# Patient Record
Sex: MALE | Race: WHITE | NOT HISPANIC OR LATINO | Employment: OTHER | URBAN - NONMETROPOLITAN AREA
[De-identification: names, ages, dates, MRNs, and addresses within clinical notes are randomized per-mention and may not be internally consistent; named-entity substitution may affect disease eponyms.]

---

## 2018-11-27 ENCOUNTER — OFFICE VISIT (OUTPATIENT)
Dept: ORTHOPEDIC SURGERY | Facility: CLINIC | Age: 63
End: 2018-11-27

## 2018-11-27 DIAGNOSIS — M25.521 ARTHRALGIA OF RIGHT ELBOW: Primary | ICD-10-CM

## 2018-11-27 DIAGNOSIS — M71.021 ABSCESS OF RIGHT OLECRANON BURSA: ICD-10-CM

## 2018-11-27 PROCEDURE — 99204 OFFICE O/P NEW MOD 45 MIN: CPT | Performed by: ORTHOPAEDIC SURGERY

## 2018-11-27 PROCEDURE — 29105 APPLICATION LONG ARM SPLINT: CPT | Performed by: ORTHOPAEDIC SURGERY

## 2018-11-27 RX ORDER — LOSARTAN POTASSIUM 25 MG/1
25 TABLET ORAL DAILY
COMMUNITY
End: 2020-09-10 | Stop reason: SDUPTHER

## 2018-11-27 RX ORDER — AMLODIPINE BESYLATE 10 MG/1
5 TABLET ORAL DAILY
COMMUNITY

## 2018-11-27 RX ORDER — SULFAMETHOXAZOLE AND TRIMETHOPRIM 800; 160 MG/1; MG/1
1 TABLET ORAL 2 TIMES DAILY
Qty: 28 TABLET | Refills: 0 | Status: SHIPPED | OUTPATIENT
Start: 2018-11-27 | End: 2020-09-10

## 2018-11-27 RX ORDER — ASPIRIN 81 MG/1
81 TABLET, CHEWABLE ORAL DAILY
COMMUNITY

## 2018-11-27 NOTE — PROGRESS NOTES
Subjective   Patient ID: Michele Joaquin is a 63 y.o. male  Pain and Consult of the Right Elbow (Is being seen today at the request of Dr. Rafi Tafoya MD.)             Pain   Pertinent negatives include no abdominal pain, chest pain, diaphoresis, fever, headaches, nausea, sore throat or vomiting.     Right-hand-dominant lemos says he fell 4-6 weeks ago on his right elbow sustained an injury had initial pain on the medial epicondylar area, had initial x-rays negative for fracture was found to have swelling and pain given a steroid injection into the olecranon bursa on 11/12--after which he experienced continued pain and swelling was seen 11/20 in the office Dr. Tafoya aspiration performed cultures positive for staph aureus sensitive to Cleocin patient was begun on Cleocin therapy seen again in the office today where continued swelling and fluid collection in the olecranon bursa was noted repeat aspiration was carried out today aspirated for purulent fluid and sent for cultures patient is sent here for further evaluation of his olecranon bursa at the right elbow.  Denies fever denies systemic illness numbness or tingling in the right hand or lower arm, swelling is improved in the forearm and medial collar pain is improved since injury.  He has not been immobilized since injury occurred.      Review of Systems   Constitutional: Negative for diaphoresis, fever and unexpected weight change.   HENT: Negative for dental problem and sore throat.    Eyes: Negative for visual disturbance.   Respiratory: Negative for shortness of breath.    Cardiovascular: Negative for chest pain.   Gastrointestinal: Negative for abdominal pain, constipation, diarrhea, nausea and vomiting.   Genitourinary: Negative for difficulty urinating and frequency.   Neurological: Negative for headaches.   Hematological: Does not bruise/bleed easily.       No past medical history on file.     No past surgical history on file.    No family history on  file.    Social History     Socioeconomic History   • Marital status:      Spouse name: Not on file   • Number of children: Not on file   • Years of education: Not on file   • Highest education level: Not on file   Social Needs   • Financial resource strain: Not on file   • Food insecurity - worry: Not on file   • Food insecurity - inability: Not on file   • Transportation needs - medical: Not on file   • Transportation needs - non-medical: Not on file   Occupational History   • Not on file   Tobacco Use   • Smoking status: Not on file   Substance and Sexual Activity   • Alcohol use: Not on file   • Drug use: Not on file   • Sexual activity: Not on file   Other Topics Concern   • Not on file   Social History Narrative   • Not on file       I have reviewed all of the above social hx, family hx, surgical hx, medications, allergies & ROS and confirm that it is accurate.    Allergies not on file      Current Outpatient Medications:   •  sulfamethoxazole-trimethoprim (BACTRIM DS,SEPTRA DS) 800-160 MG per tablet, Take 1 tablet by mouth 2 (Two) Times a Day., Disp: 28 tablet, Rfl: 0    Objective   There were no vitals taken for this visit.   Physical Exam  Constitutional: Patient is oriented to person, place, and time. Patient appears well-developed and well-nourished.   HENT:Head: Normocephalic and atraumatic.   Eyes: EOM are normal. Pupils are equal, round, and reactive to light.   Neck: Normal range of motion. Neck supple.   Cardiovascular: Normal rate.    Pulmonary/Chest: Effort normal and breath sounds normal.   Abdominal: Soft.   Neurological: Patient is alert and oriented to person, place, and time.   Skin: Skin is warm and dry.   Psychiatric: Patient has a normal mood and affect.   Nursing note and vitals reviewed.       [unfilled]   Right elbow with fluctuance at the olecranon bursa minimal erythema some consolidation of the periphery of the ocular bursa is present with induration no signs of systemic  cellulitis around the upper arm, medial epicondylar region is slightly tender minimally swollen very minimal residual ecchymosis.  Forearm is soft neurovascular status to the hand is intact.  There is no active drainage or open wounds noted at the elbow forearm or hand.  Patient has  minimal pain with supination pronation which is full and intact and symmetric, minimal pain with flexion extension at the elbow.    Assessment/Plan   Review of Radiographic Studies:    Radiographic images today of affected area I personally viewed and showed no sign of acute fracture or dislocation.      Right long arm fiberglass splint application  Date/Time: 11/27/2018 2:01 PM  Performed by: John Mcintyre MD  Authorized by: John Mcintyre MD   Consent: Verbal consent obtained.  Risks and benefits: risks, benefits and alternatives were discussed  Consent given by: patient  Patient understanding: patient states understanding of the procedure being performed  Patient identity confirmed: verbally with patient  Location details: right arm  Splint type: long arm  Supplies used: Ortho-Glass  Post-procedure: The splinted body part was neurovascularly unchanged following the procedure.  Patient tolerance: Patient tolerated the procedure well with no immediate complications           Michele was seen today for pain and consult.    Diagnoses and all orders for this visit:    Arthralgia of right elbow  -     XR Elbow 3+ View Right    Abscess of right olecranon bursa    Other orders  -     sulfamethoxazole-trimethoprim (BACTRIM DS,SEPTRA DS) 800-160 MG per tablet; Take 1 tablet by mouth 2 (Two) Times a Day.       Orthopedic activities reviewed and patient expressed appreciation and Risk, benefits, and merits of treatment alternatives reviewed with the patient and questions answered      Recommendations/Plan:   Work/Activity Status: No use of involved extremity    Patient agreeable to call or return sooner for any concerns.       Reviewed and  discussed the patient's condition with him and his wife given he has had 2 aspirations with minimal fluid collection at the olecranon bursa and already received partial treatments Cleocin recommended observation immobilization change of antibiotic therapy for Cleocin and Bactrim reexamined 2 days.  If fluctuance recurs pain increases or fever develops formal irrigation debridement in the operating room is recommended and I discussed this with the patient he is in agreement.      Impression:  Status post right elbow trauma subsequent olecranon bursitis steroid injection with subsequent staph aureus colonization probable septic bursa  Plan:  Observation posterior splint recheck in 2 days check cultures which were sent from today, continue with Bactrim and place of Cleocin, discuss formal irrigation debridement and intraoperative cultures in  several days if not responding to above treatment.

## 2018-11-29 ENCOUNTER — APPOINTMENT (OUTPATIENT)
Dept: PREADMISSION TESTING | Facility: HOSPITAL | Age: 63
End: 2018-11-29

## 2018-11-29 ENCOUNTER — PREP FOR SURGERY (OUTPATIENT)
Dept: OTHER | Facility: HOSPITAL | Age: 63
End: 2018-11-29

## 2018-11-29 ENCOUNTER — OFFICE VISIT (OUTPATIENT)
Dept: ORTHOPEDIC SURGERY | Facility: CLINIC | Age: 63
End: 2018-11-29

## 2018-11-29 ENCOUNTER — HOSPITAL ENCOUNTER (OUTPATIENT)
Dept: GENERAL RADIOLOGY | Facility: HOSPITAL | Age: 63
Discharge: HOME | End: 2018-11-29
Admitting: ORTHOPAEDIC SURGERY

## 2018-11-29 VITALS — BODY MASS INDEX: 23.48 KG/M2 | HEIGHT: 70 IN | WEIGHT: 164 LBS | RESPIRATION RATE: 16 BRPM

## 2018-11-29 VITALS
HEIGHT: 71 IN | HEART RATE: 64 BPM | OXYGEN SATURATION: 97 % | BODY MASS INDEX: 22.96 KG/M2 | SYSTOLIC BLOOD PRESSURE: 108 MMHG | DIASTOLIC BLOOD PRESSURE: 71 MMHG | WEIGHT: 164 LBS

## 2018-11-29 DIAGNOSIS — M25.521 ARTHRALGIA OF RIGHT ELBOW: Primary | ICD-10-CM

## 2018-11-29 DIAGNOSIS — M25.521 ARTHRALGIA OF RIGHT ELBOW: ICD-10-CM

## 2018-11-29 DIAGNOSIS — Z01.818 PREOP EXAMINATION: ICD-10-CM

## 2018-11-29 DIAGNOSIS — Z01.818 PREOP EXAMINATION: Primary | ICD-10-CM

## 2018-11-29 DIAGNOSIS — M71.121 SEPTIC BURSITIS OF ELBOW, RIGHT: ICD-10-CM

## 2018-11-29 LAB
ALBUMIN SERPL-MCNC: 4.3 G/DL (ref 3.5–5)
ALBUMIN/GLOB SERPL: 1.4 G/DL (ref 1–2)
ALP SERPL-CCNC: 131 U/L (ref 38–126)
ALT SERPL W P-5'-P-CCNC: 82 U/L (ref 13–69)
ANION GAP SERPL CALCULATED.3IONS-SCNC: 11.4 MMOL/L (ref 10–20)
AST SERPL-CCNC: 40 U/L (ref 15–46)
BASOPHILS # BLD AUTO: 0.03 10*3/MM3 (ref 0–0.2)
BASOPHILS NFR BLD AUTO: 0.3 % (ref 0–2.5)
BILIRUB SERPL-MCNC: 0.4 MG/DL (ref 0.2–1.3)
BUN BLD-MCNC: 21 MG/DL (ref 7–20)
BUN/CREAT SERPL: 21 (ref 6.3–21.9)
CALCIUM SPEC-SCNC: 9.6 MG/DL (ref 8.4–10.2)
CHLORIDE SERPL-SCNC: 92 MMOL/L (ref 98–107)
CO2 SERPL-SCNC: 28 MMOL/L (ref 26–30)
CREAT BLD-MCNC: 1 MG/DL (ref 0.6–1.3)
DEPRECATED RDW RBC AUTO: 40.3 FL (ref 37–54)
EOSINOPHIL # BLD AUTO: 0.1 10*3/MM3 (ref 0–0.7)
EOSINOPHIL NFR BLD AUTO: 0.8 % (ref 0–7)
ERYTHROCYTE [DISTWIDTH] IN BLOOD BY AUTOMATED COUNT: 12.7 % (ref 11.5–14.5)
GFR SERPL CREATININE-BSD FRML MDRD: 75 ML/MIN/1.73
GLOBULIN UR ELPH-MCNC: 3 GM/DL
GLUCOSE BLD-MCNC: 85 MG/DL (ref 74–98)
GRAM STN SPEC: NORMAL
GRAM STN SPEC: NORMAL
HCT VFR BLD AUTO: 37.2 % (ref 42–52)
HGB BLD-MCNC: 12.9 G/DL (ref 14–18)
IMM GRANULOCYTES # BLD: 0.17 10*3/MM3 (ref 0–0.06)
IMM GRANULOCYTES NFR BLD: 1.4 % (ref 0–0.6)
LYMPHOCYTES # BLD AUTO: 1.93 10*3/MM3 (ref 0.6–3.4)
LYMPHOCYTES NFR BLD AUTO: 16.1 % (ref 10–50)
MCH RBC QN AUTO: 30 PG (ref 27–31)
MCHC RBC AUTO-ENTMCNC: 34.7 G/DL (ref 30–37)
MCV RBC AUTO: 86.5 FL (ref 80–94)
MONOCYTES # BLD AUTO: 0.85 10*3/MM3 (ref 0–0.9)
MONOCYTES NFR BLD AUTO: 7.1 % (ref 0–12)
NEUTROPHILS # BLD AUTO: 8.88 10*3/MM3 (ref 2–6.9)
NEUTROPHILS NFR BLD AUTO: 74.3 % (ref 37–80)
NRBC BLD MANUAL-RTO: 0 /100 WBC (ref 0–0)
PLATELET # BLD AUTO: 304 10*3/MM3 (ref 130–400)
PMV BLD AUTO: 8.2 FL (ref 6–12)
POTASSIUM BLD-SCNC: 4.4 MMOL/L (ref 3.5–5.1)
PROT SERPL-MCNC: 7.3 G/DL (ref 6.3–8.2)
RBC # BLD AUTO: 4.3 10*6/MM3 (ref 4.7–6.1)
SODIUM BLD-SCNC: 127 MMOL/L (ref 137–145)
WBC NRBC COR # BLD: 11.96 10*3/MM3 (ref 4.8–10.8)

## 2018-11-29 PROCEDURE — 20605 DRAIN/INJ JOINT/BURSA W/O US: CPT | Performed by: ORTHOPAEDIC SURGERY

## 2018-11-29 PROCEDURE — 99214 OFFICE O/P EST MOD 30 MIN: CPT | Performed by: ORTHOPAEDIC SURGERY

## 2018-11-29 PROCEDURE — 87205 SMEAR GRAM STAIN: CPT | Performed by: ORTHOPAEDIC SURGERY

## 2018-11-29 PROCEDURE — 87147 CULTURE TYPE IMMUNOLOGIC: CPT | Performed by: ORTHOPAEDIC SURGERY

## 2018-11-29 PROCEDURE — 71046 X-RAY EXAM CHEST 2 VIEWS: CPT

## 2018-11-29 PROCEDURE — 85025 COMPLETE CBC W/AUTO DIFF WBC: CPT | Performed by: ORTHOPAEDIC SURGERY

## 2018-11-29 PROCEDURE — 93005 ELECTROCARDIOGRAM TRACING: CPT | Performed by: ORTHOPAEDIC SURGERY

## 2018-11-29 PROCEDURE — 87186 SC STD MICRODIL/AGAR DIL: CPT | Performed by: ORTHOPAEDIC SURGERY

## 2018-11-29 PROCEDURE — 80053 COMPREHEN METABOLIC PANEL: CPT | Performed by: ORTHOPAEDIC SURGERY

## 2018-11-29 PROCEDURE — 87075 CULTR BACTERIA EXCEPT BLOOD: CPT | Performed by: ORTHOPAEDIC SURGERY

## 2018-11-29 PROCEDURE — 87081 CULTURE SCREEN ONLY: CPT | Performed by: ORTHOPAEDIC SURGERY

## 2018-11-29 PROCEDURE — 87070 CULTURE OTHR SPECIMN AEROBIC: CPT | Performed by: ORTHOPAEDIC SURGERY

## 2018-11-29 PROCEDURE — 36415 COLL VENOUS BLD VENIPUNCTURE: CPT

## 2018-11-29 PROCEDURE — 87077 CULTURE AEROBIC IDENTIFY: CPT | Performed by: ORTHOPAEDIC SURGERY

## 2018-11-29 RX ORDER — CLINDAMYCIN HYDROCHLORIDE 300 MG/1
300 CAPSULE ORAL 3 TIMES DAILY
COMMUNITY
End: 2020-09-10

## 2018-11-29 RX ORDER — CEFAZOLIN SODIUM 2 G/50ML
2 SOLUTION INTRAVENOUS
Status: CANCELLED | OUTPATIENT
Start: 2018-11-30 | End: 2018-12-01

## 2018-11-29 RX ORDER — ATORVASTATIN CALCIUM 40 MG/1
80 TABLET, FILM COATED ORAL DAILY
COMMUNITY
End: 2020-09-10 | Stop reason: SDUPTHER

## 2018-11-29 RX ORDER — NITROGLYCERIN 0.4 MG/1
0.4 TABLET SUBLINGUAL
COMMUNITY

## 2018-11-29 RX ORDER — ALBUTEROL SULFATE 90 UG/1
2 AEROSOL, METERED RESPIRATORY (INHALATION) EVERY 6 HOURS PRN
COMMUNITY

## 2018-11-29 RX ORDER — MELOXICAM 15 MG/1
15 TABLET ORAL DAILY
COMMUNITY

## 2018-11-29 RX ORDER — ALBUTEROL SULFATE 1.25 MG/3ML
1 SOLUTION RESPIRATORY (INHALATION) EVERY 6 HOURS PRN
COMMUNITY

## 2018-11-29 RX ORDER — HYDROCHLOROTHIAZIDE 25 MG/1
25 TABLET ORAL DAILY
COMMUNITY

## 2018-11-29 NOTE — PROGRESS NOTES
Subjective   Patient ID: Michele Joaquin is a 63 y.o. male  Pain of the Right Elbow             History of Present Illness    Right-hand-dominant aminta who 4-6 weeks ago fell on his elbow sustaining an injury then went to her PCP on 11/12 at that time he was x-rayed found to have no fracture was given an injection of steroid into his olecranon bursa returned again on 11/ 20 where he was found to have small fluid collection which was aspirated sent for culture this confirmed positive staph aureus-- he was placed on Cleocin which he took until 11/27 at which point he will is reaspirated in the PCP office in [facility] found to have small amount of fluid in the olecrenon bursa-- cultures were not sent-- I saw him placed him on antibiotics gave him a splint he comes in today with continued pain swelling and tenderness at the right elbow olecranon region.  Denies fever systemic illness redness in the forearm wrist or hand his medial elbow pain is improved since the date of his original injury 4-6 weeks ago.    Review of Systems   Constitutional: Negative.  Negative for diaphoresis, fever and unexpected weight change.   HENT: Negative for dental problem and sore throat.    Eyes: Negative for visual disturbance.   Respiratory: Negative for shortness of breath.    Cardiovascular: Negative for chest pain.   Gastrointestinal: Negative for abdominal pain, constipation, diarrhea, nausea and vomiting.   Genitourinary: Negative for difficulty urinating and frequency.   Musculoskeletal: Positive for arthralgias (right elbow).   Skin: Positive for wound.   Allergic/Immunologic: Negative.    Neurological: Negative for headaches.   Hematological: Does not bruise/bleed easily.       Past Medical History:   Diagnosis Date   • Emphysema lung (CMS/HCC)    • Hyperlipidemia    • Hypertension         Past Surgical History:   Procedure Laterality Date   • KNEE SURGERY     • PROSTATECTOMY         Family History   Problem Relation Age of  "Onset   • Cancer Father        Social History     Socioeconomic History   • Marital status:      Spouse name: Not on file   • Number of children: Not on file   • Years of education: Not on file   • Highest education level: Not on file   Social Needs   • Financial resource strain: Not on file   • Food insecurity - worry: Not on file   • Food insecurity - inability: Not on file   • Transportation needs - medical: Not on file   • Transportation needs - non-medical: Not on file   Occupational History   • Not on file   Tobacco Use   • Smoking status: Current Every Day Smoker     Packs/day: 1.00     Years: 45.00     Pack years: 45.00   • Smokeless tobacco: Never Used   Substance and Sexual Activity   • Alcohol use: No     Frequency: Never   • Drug use: No   • Sexual activity: Defer   Other Topics Concern   • Not on file   Social History Narrative   • Not on file       I have reviewed all of the above social hx, family hx, surgical hx, medications, allergies & ROS and confirm that it is accurate.    No Known Allergies      Current Outpatient Medications:   •  amLODIPine (NORVASC) 10 MG tablet, Take 10 mg by mouth Daily., Disp: , Rfl:   •  aspirin 81 MG chewable tablet, Chew 81 mg Daily., Disp: , Rfl:   •  losartan (COZAAR) 25 MG tablet, Take 25 mg by mouth Daily., Disp: , Rfl:   •  rivaroxaban (XARELTO) 10 MG tablet, Take 10 mg by mouth Daily., Disp: , Rfl:   •  sulfamethoxazole-trimethoprim (BACTRIM DS,SEPTRA DS) 800-160 MG per tablet, Take 1 tablet by mouth 2 (Two) Times a Day., Disp: 28 tablet, Rfl: 0    Objective   Resp 16   Ht 177.8 cm (70\")   Wt 74.4 kg (164 lb)   BMI 23.53 kg/m²    Physical Exam  Constitutional: Patient is oriented to person, place, and time. Patient appears well-developed and well-nourished.   HENT:Head: Normocephalic and atraumatic.   Eyes: EOM are normal. Pupils are equal, round, and reactive to light.   Neck: Normal range of motion. Neck supple.   Cardiovascular: Normal rate.  "   Pulmonary/Chest: Effort normal and breath sounds normal.   Abdominal: Soft.   Neurological: Patient is alert and oriented to person, place, and time.   Skin: Skin is warm and dry.   Psychiatric: Patient has a normal mood and affect.   Nursing note and vitals reviewed.       [unfilled]   Right elbow with fluctuance and swelling at the olecranon bursa minimal cutaneous erythema some induration around the inferolateral aspect of the olecranon bursal area no radial head tenderness no pain with supination pronation at the elbow joint, no medial epicondylar tenderness forearm soft hands neurovascularly intact no pain at the proximal shoulder with internal/external rotation of the upper arm.    Assessment/Plan   Review of Radiographic Studies:    Radiographic images today of affected area I personally viewed and showed no sign of acute fracture or dislocation.  No new imaging done today.      Medium Joint Arthrocentesis: R olecranon bursa  Date/Time: 11/29/2018 3:56 PM  Consent given by: patient  Site marked: site marked  Timeout: Immediately prior to procedure a time out was called to verify the correct patient, procedure, equipment, support staff and site/side marked as required   Supporting Documentation  Indications: pain, joint swelling and diagnostic evaluation   Procedure Details  Location: elbow - R olecranon bursa  Preparation: Patient was prepped and draped in the usual sterile fashion  Needle size: 18 G  Approach: posterior  Aspirate amount: 20 mL  Aspirate: blood-tinged and yellow (pus)  Analysis: fluid sample sent for laboratory analysis  Patient tolerance: patient tolerated the procedure well with no immediate complications           Michele was seen today for pain.    Diagnoses and all orders for this visit:    Arthralgia of right elbow  -     Body Fluid Cell Count With Differential - Bursal Fluid, Elbow, Right  -     Anaerobic Culture - Swab, Elbow, Right  -     Culture, Routine - Swab, Elbow, Right  -      Gram Stain - Swab, Elbow, Right  -     CBC Auto Differential; Future    Septic bursitis of elbow, right       Risk, benefits, and merits of treatment alternatives reviewed with the patient and questions answered, The nature of the proposed surgery reviewed with the patient including risks, benefits, rehabilitation, recovery timeframe, and outcome expectations and Using illustrations and models, the nature of the pathology was explained to the patient      Recommendations/Plan:   Surgery: Incision drainage irrigation debridement right septic olecranon bursa    Patient agreeable to call or return sooner for any concerns.         I discussed with the patient the diagnosis, condition, natural history and treatment alternatives, both surgical and nonsurgical.  I reviewed the surgical procedural details using models, diagrams and reviewing x-ray findings.  I explained the nature of the operation, anesthesia methods and the postoperative recovery.  I explained risks and complications including but not limited to infection, bleeding, blood clotting, poor healing, chronic pain, stiffness, failure of the procedure, possible recurrence of the condition and anesthetic related risks.  The patient had opportunity to ask questions which were all answered to their satisfaction and decided to proceed with the plan for surgery.  I believe informed consent to proceed with the surgery was given verbally in my presence today.  The surgical consent form will be signed in the presence of the nursing staff prior to the surgery.    I explained the patient and his wife the risks of recurrent infection poor healing especially given the fact that this is an indolent more than 2 week old septic olecranon bursitis along with risks and complications of cellulitis neurovascular injury and failure of healing chronic pain stiffness and anesthesia related risks.  Patient does have a cardiac history and is instructed to discontinue his anticoagulants  on the morning of surgery.  He also understands increased risk of blood loss secondary to his anticoagulation status.    Impression:  2 week old right elbow septic olecranon bursitis without signs of septic arthritis or cellulitis  Plan:  Incision drainage irrigation debridement septic right elbow olecranon bursa

## 2018-11-30 ENCOUNTER — ANESTHESIA (OUTPATIENT)
Dept: PERIOP | Facility: HOSPITAL | Age: 63
End: 2018-11-30

## 2018-11-30 ENCOUNTER — HOSPITAL ENCOUNTER (OUTPATIENT)
Facility: HOSPITAL | Age: 63
Setting detail: HOSPITAL OUTPATIENT SURGERY
Discharge: HOME | End: 2018-11-30
Attending: ORTHOPAEDIC SURGERY | Admitting: ORTHOPAEDIC SURGERY

## 2018-11-30 ENCOUNTER — ANESTHESIA EVENT (OUTPATIENT)
Dept: PERIOP | Facility: HOSPITAL | Age: 63
End: 2018-11-30

## 2018-11-30 VITALS
OXYGEN SATURATION: 93 % | DIASTOLIC BLOOD PRESSURE: 98 MMHG | TEMPERATURE: 97.2 F | SYSTOLIC BLOOD PRESSURE: 127 MMHG | RESPIRATION RATE: 18 BRPM | HEART RATE: 71 BPM

## 2018-11-30 DIAGNOSIS — Z01.818 PREOP EXAMINATION: Primary | ICD-10-CM

## 2018-11-30 LAB — SODIUM BLD-SCNC: 130 MMOL/L (ref 137–145)

## 2018-11-30 PROCEDURE — 87102 FUNGUS ISOLATION CULTURE: CPT | Performed by: ORTHOPAEDIC SURGERY

## 2018-11-30 PROCEDURE — 87147 CULTURE TYPE IMMUNOLOGIC: CPT | Performed by: ORTHOPAEDIC SURGERY

## 2018-11-30 PROCEDURE — 87186 SC STD MICRODIL/AGAR DIL: CPT | Performed by: ORTHOPAEDIC SURGERY

## 2018-11-30 PROCEDURE — 87075 CULTR BACTERIA EXCEPT BLOOD: CPT | Performed by: ORTHOPAEDIC SURGERY

## 2018-11-30 PROCEDURE — 25010000002 FENTANYL CITRATE (PF) 100 MCG/2ML SOLUTION: Performed by: NURSE ANESTHETIST, CERTIFIED REGISTERED

## 2018-11-30 PROCEDURE — 25010000002 ONDANSETRON PER 1 MG: Performed by: NURSE ANESTHETIST, CERTIFIED REGISTERED

## 2018-11-30 PROCEDURE — 25010000002 PROPOFOL 10 MG/ML EMULSION: Performed by: NURSE ANESTHETIST, CERTIFIED REGISTERED

## 2018-11-30 PROCEDURE — 24105 EXCISION OLECRANON BURSA: CPT | Performed by: ORTHOPAEDIC SURGERY

## 2018-11-30 PROCEDURE — 25010000002 MIDAZOLAM PER 1 MG: Performed by: NURSE ANESTHETIST, CERTIFIED REGISTERED

## 2018-11-30 PROCEDURE — 84295 ASSAY OF SERUM SODIUM: CPT | Performed by: NURSE ANESTHETIST, CERTIFIED REGISTERED

## 2018-11-30 PROCEDURE — 25010000002 PROPOFOL 1000 MG/ML EMULSION: Performed by: NURSE ANESTHETIST, CERTIFIED REGISTERED

## 2018-11-30 PROCEDURE — 87176 TISSUE HOMOGENIZATION CULTR: CPT | Performed by: ORTHOPAEDIC SURGERY

## 2018-11-30 PROCEDURE — 87070 CULTURE OTHR SPECIMN AEROBIC: CPT | Performed by: ORTHOPAEDIC SURGERY

## 2018-11-30 PROCEDURE — 87077 CULTURE AEROBIC IDENTIFY: CPT | Performed by: ORTHOPAEDIC SURGERY

## 2018-11-30 PROCEDURE — 25010000002 ROPIVACAINE PER 1 MG: Performed by: NURSE ANESTHETIST, CERTIFIED REGISTERED

## 2018-11-30 RX ORDER — ONDANSETRON 2 MG/ML
4 INJECTION INTRAMUSCULAR; INTRAVENOUS ONCE AS NEEDED
Status: DISCONTINUED | OUTPATIENT
Start: 2018-11-30 | End: 2018-11-30 | Stop reason: HOSPADM

## 2018-11-30 RX ORDER — ROPIVACAINE HYDROCHLORIDE 5 MG/ML
INJECTION, SOLUTION EPIDURAL; INFILTRATION; PERINEURAL
Status: COMPLETED | OUTPATIENT
Start: 2018-11-30 | End: 2018-11-30

## 2018-11-30 RX ORDER — PROPOFOL 10 MG/ML
VIAL (ML) INTRAVENOUS AS NEEDED
Status: DISCONTINUED | OUTPATIENT
Start: 2018-11-30 | End: 2018-11-30 | Stop reason: SURG

## 2018-11-30 RX ORDER — CEPHALEXIN 500 MG/1
500 CAPSULE ORAL EVERY 6 HOURS
Qty: 56 CAPSULE | Refills: 0 | Status: SHIPPED | OUTPATIENT
Start: 2018-11-30 | End: 2020-09-10

## 2018-11-30 RX ORDER — ONDANSETRON 2 MG/ML
INJECTION INTRAMUSCULAR; INTRAVENOUS AS NEEDED
Status: DISCONTINUED | OUTPATIENT
Start: 2018-11-30 | End: 2018-11-30 | Stop reason: SURG

## 2018-11-30 RX ORDER — MIDAZOLAM HYDROCHLORIDE 1 MG/ML
INJECTION INTRAMUSCULAR; INTRAVENOUS AS NEEDED
Status: DISCONTINUED | OUTPATIENT
Start: 2018-11-30 | End: 2018-11-30 | Stop reason: SURG

## 2018-11-30 RX ORDER — HYDROCODONE BITARTRATE AND ACETAMINOPHEN 5; 325 MG/1; MG/1
1-2 TABLET ORAL EVERY 6 HOURS PRN
Qty: 30 TABLET | Refills: 0 | Status: SHIPPED | OUTPATIENT
Start: 2018-11-30 | End: 2020-09-10

## 2018-11-30 RX ORDER — SODIUM CHLORIDE 9 MG/ML
50 INJECTION, SOLUTION INTRAVENOUS CONTINUOUS
Status: CANCELLED | OUTPATIENT
Start: 2018-11-30

## 2018-11-30 RX ORDER — SODIUM CHLORIDE, SODIUM LACTATE, POTASSIUM CHLORIDE, CALCIUM CHLORIDE 600; 310; 30; 20 MG/100ML; MG/100ML; MG/100ML; MG/100ML
1000 INJECTION, SOLUTION INTRAVENOUS CONTINUOUS
Status: CANCELLED | OUTPATIENT
Start: 2018-11-30

## 2018-11-30 RX ORDER — FENTANYL CITRATE 50 UG/ML
INJECTION, SOLUTION INTRAMUSCULAR; INTRAVENOUS AS NEEDED
Status: DISCONTINUED | OUTPATIENT
Start: 2018-11-30 | End: 2018-11-30 | Stop reason: SURG

## 2018-11-30 RX ORDER — OXYCODONE AND ACETAMINOPHEN 7.5; 325 MG/1; MG/1
1 TABLET ORAL EVERY 4 HOURS PRN
Status: DISCONTINUED | OUTPATIENT
Start: 2018-11-30 | End: 2018-11-30 | Stop reason: HOSPADM

## 2018-11-30 RX ORDER — ONDANSETRON 4 MG/1
4 TABLET, FILM COATED ORAL ONCE AS NEEDED
Status: DISCONTINUED | OUTPATIENT
Start: 2018-11-30 | End: 2018-11-30 | Stop reason: HOSPADM

## 2018-11-30 RX ORDER — SODIUM CHLORIDE 9 MG/ML
1000 INJECTION, SOLUTION INTRAVENOUS CONTINUOUS
Status: DISCONTINUED | OUTPATIENT
Start: 2018-11-30 | End: 2018-11-30 | Stop reason: HOSPADM

## 2018-11-30 RX ADMIN — LIDOCAINE HYDROCHLORIDE 80 MG: 20 INJECTION, SOLUTION INTRAVENOUS at 12:05

## 2018-11-30 RX ADMIN — FENTANYL CITRATE 50 MCG: 50 INJECTION, SOLUTION INTRAMUSCULAR; INTRAVENOUS at 12:00

## 2018-11-30 RX ADMIN — ONDANSETRON 4 MG: 2 INJECTION INTRAMUSCULAR; INTRAVENOUS at 12:21

## 2018-11-30 RX ADMIN — PROPOFOL 50 MG: 10 INJECTION, EMULSION INTRAVENOUS at 12:05

## 2018-11-30 RX ADMIN — SODIUM CHLORIDE 1000 ML: 9 INJECTION, SOLUTION INTRAVENOUS at 10:10

## 2018-11-30 RX ADMIN — MIDAZOLAM HYDROCHLORIDE 1 MG: 1 INJECTION, SOLUTION INTRAMUSCULAR; INTRAVENOUS at 12:00

## 2018-11-30 RX ADMIN — PROPOFOL 50 MG: 10 INJECTION, EMULSION INTRAVENOUS at 12:15

## 2018-11-30 RX ADMIN — PROPOFOL 120 MCG/KG/MIN: 10 INJECTION, EMULSION INTRAVENOUS at 12:09

## 2018-11-30 RX ADMIN — VANCOMYCIN HYDROCHLORIDE 1000 MG: 1 INJECTION, POWDER, LYOPHILIZED, FOR SOLUTION INTRAVENOUS at 11:57

## 2018-11-30 RX ADMIN — ROPIVACAINE HYDROCHLORIDE 30 ML: 5 INJECTION, SOLUTION EPIDURAL; INFILTRATION; PERINEURAL at 11:26

## 2018-11-30 RX ADMIN — PROPOFOL 50 MG: 10 INJECTION, EMULSION INTRAVENOUS at 12:06

## 2018-11-30 NOTE — ANESTHESIA PREPROCEDURE EVALUATION
Anesthesia Evaluation     Patient summary reviewed and Nursing notes reviewed   no history of anesthetic complications:  NPO Solid Status: > 8 hours  NPO Liquid Status: > 8 hours           Airway   Mallampati: I  TM distance: >3 FB  Neck ROM: full  no difficulty expected  Dental - normal exam     Pulmonary - normal exam   (+) a smoker Current Smoked day of surgery, COPD moderate, decreased breath sounds,     ROS comment: Emphysema  Cardiovascular - normal exam    PT is on anticoagulation therapy  Patient on routine beta blocker and Beta blocker given within 24 hours of surgery  Rhythm: regular  Rate: normal    (+) hypertension 2 medications or greater, PVD, hyperlipidemia,       Neuro/Psych  GI/Hepatic/Renal/Endo    (+)  GERD,      Musculoskeletal     Abdominal  - normal exam    Abdomen: soft.  Bowel sounds: normal.   Substance History - negative use     OB/GYN negative ob/gyn ROS         Other   (+) arthritis     ROS/Med Hx Other: Na 127 on 11/29. Redrawn 11/30 Na 130. Patient nonsymptomatic                  Anesthesia Plan    ASA 3     MAC   (Risks and benefits discussed including risk of aspiration, recall and dental damage. All patient questions answered. Will continue with POC.    Single shot Infraclavicular PNB  Risk vs Benefits discussed with patient to include infection, bleeding at the site, paresthesia, and incomplete analgesia.  )  intravenous induction   Anesthetic plan, all risks, benefits, and alternatives have been provided, discussed and informed consent has been obtained with: patient.

## 2018-11-30 NOTE — ANESTHESIA PROCEDURE NOTES
Peripheral Block      Patient reassessed immediately prior to procedure    Start time: 11/30/2018 11:26 AM  Stop time: 11/30/2018 11:37 AM  Reason for block: at surgeon's request and post-op pain management  Preanesthetic Checklist  Completed: patient identified, site marked, surgical consent, pre-op evaluation, timeout performed, IV checked, risks and benefits discussed and monitors and equipment checked  Prep:  Pt Position: supine  Sterile barriers:cap, gloves, mask and sterile barriers  Prep: ChloraPrep  Patient monitoring: blood pressure monitoring, continuous pulse oximetry and EKG  Procedure  Sedation:yes    Guidance:ultrasound guided  ULTRASOUND INTERPRETATION. Using ultrasound guidance a gauge needle was placed in close proximity to the brachial plexus nerve, at which point, under ultrasound guidance anesthetic was injected in the area of the nerve and spread of the anesthesia was seen on ultrasound in close proximity thereto.  There were no abnormalities seen on ultrasound; a digital image was taken; and the patient tolerated the procedure with no complications. Images:still images obtained    Laterality:right  Block Type:infraclavicular  Injection Technique:single-shot  Needle Type:echogenic  Needle Gauge:21 G  Resistance on Injection: none  Medications Used: ropivacaine (NAROPIN) injection 0.5 %, 30 mL  Medications  Comment:Adjuncts per total volume of LA:    Decadron 8 mg PSF      If required, intravenous sedation was given -- see meds on anesthesia record.    Post Assessment  Injection Assessment: negative aspiration for heme, no paresthesia on injection and incremental injection  Patient Tolerance:comfortable throughout block  Complications:no  Additional Notes       Procedure:               SINGLE SHOT INFRACLAVICULAR                                       Patient analgesia was achieved with IV Sedation( see meds)       The pt was placed in semi-fowlers position with a slight tilt of the thorax  contralateral to the insertion site.  The Insertion Site was prepped and draped in sterile fashion.  The skin was anesthetized with Lidocaine 1% 1ml injection utilizing a 25g needle.  Utilizing ultrasound guidance, a BBraun 20 ga echogenic needle was advanced in-plane.  Major vessels (carotid and Internal Jugular) were visualized as the brachial plexus was approached anterior.  The Lateral and Medial cords were identified.  The needle tip was placed posterior to the subclavian artery and Local anesthesia was injected incrementally every 5 mls with aspiration. Injection pressure was normal or little; there was no intraneural injection, no vascular injection.

## 2018-11-30 NOTE — ANESTHESIA POSTPROCEDURE EVALUATION
Patient: Hung Joaquin    Procedure Summary     Date:  11/30/18 Room / Location:  [facility] OR 3 /  [facility]    Anesthesia Start:  1157 Anesthesia Stop:      Procedure:  Incision drainage irrigation debridement septic right elbow olecranon bursa, placement of drain (Right Elbow) Diagnosis:       Preop examination      (Preop examination [Z01.818])    Surgeon:  John Mcintyre MD Provider:  Berny Ding CRNA    Anesthesia Type:  MAC ASA Status:  3          Anesthesia Type: MAC  Last vitals  BP   88/52   Temp   97   Pulse   47   Resp   18   SpO2   98     Post Anesthesia Care and Evaluation    Patient location during evaluation: PACU  Patient participation: complete - patient participated  Level of consciousness: awake and alert  Pain score: 0  Pain management: satisfactory to patient  Airway patency: patent  Anesthetic complications: No anesthetic complications  PONV Status: none  Cardiovascular status: acceptable and stable  Respiratory status: acceptable and nasal cannula  Hydration status: acceptable

## 2018-12-02 LAB
BACTERIA SPEC AEROBE CULT: ABNORMAL
MRSA SPEC QL CULT: NORMAL

## 2018-12-03 ENCOUNTER — OFFICE VISIT (OUTPATIENT)
Dept: ORTHOPEDIC SURGERY | Facility: CLINIC | Age: 63
End: 2018-12-03

## 2018-12-03 VITALS — HEIGHT: 71 IN | WEIGHT: 167 LBS | BODY MASS INDEX: 23.38 KG/M2 | RESPIRATION RATE: 18 BRPM

## 2018-12-03 DIAGNOSIS — M71.121 SEPTIC BURSITIS OF ELBOW, RIGHT: Primary | ICD-10-CM

## 2018-12-03 LAB
BACTERIA SPEC AEROBE CULT: ABNORMAL
BACTERIA SPEC AEROBE CULT: ABNORMAL
BACTERIA SPEC ANAEROBE CULT: NORMAL

## 2018-12-03 PROCEDURE — 99024 POSTOP FOLLOW-UP VISIT: CPT | Performed by: ORTHOPAEDIC SURGERY

## 2018-12-03 RX ORDER — MINOCYCLINE HYDROCHLORIDE 100 MG/1
CAPSULE ORAL
COMMUNITY
Start: 2018-11-27 | End: 2020-09-10

## 2018-12-03 RX ORDER — FLUTICASONE PROPIONATE 50 UG/1
SPRAY, METERED NASAL
COMMUNITY
Start: 2018-12-03 | End: 2020-10-05 | Stop reason: HOSPADM

## 2018-12-03 RX ORDER — RIVAROXABAN 20 MG/1
20 TABLET, FILM COATED ORAL DAILY
COMMUNITY
Start: 2018-11-19

## 2018-12-03 RX ORDER — METOPROLOL SUCCINATE 25 MG/1
TABLET, EXTENDED RELEASE ORAL
COMMUNITY
Start: 2018-11-16 | End: 2020-09-10 | Stop reason: ALTCHOICE

## 2018-12-03 RX ORDER — LOSARTAN POTASSIUM 100 MG/1
100 TABLET ORAL DAILY
COMMUNITY
Start: 2018-11-21

## 2018-12-03 NOTE — PROGRESS NOTES
"Subjective   Patient ID: Hung Joaquin is a 63 y.o. male  Post-op of the Right Elbow (Patient is here for a a postoperative visit for his I&D on 11/30. He denies any pain today.)             History of Present Illness    He returns status post I&D right elbow olecranon bursa cultures intraoperatively showed staph aureus sensitive to keflex he's been tolerating his antibiotics no adverse reactions no fever and has drained his bed several times without any complications.  No placement numbness or tingling in his right hand.    Review of Systems   Constitutional: Negative for fever.   HENT: Negative for voice change.    Eyes: Negative for visual disturbance.   Respiratory: Negative for shortness of breath.    Cardiovascular: Negative for chest pain.   Gastrointestinal: Negative for abdominal distention and abdominal pain.   Genitourinary: Negative for dysuria.   Musculoskeletal: Negative for arthralgias, gait problem and joint swelling.   Skin: Negative for rash.   Neurological: Negative for speech difficulty.   Hematological: Does not bruise/bleed easily.   Psychiatric/Behavioral: Negative for confusion.       Past Medical History:   Diagnosis Date   • Arthritis    • Emphysema lung (CMS/HCC)    • Enlarged prostate    • GERD (gastroesophageal reflux disease)    • Hyperlipidemia    • Hypertension    • Jaundice 1973        Past Surgical History:   Procedure Laterality Date   • CARDIAC CATHETERIZATION  2008    \"IT WAS OK\"   • KNEE SURGERY     • PROSTATE SURGERY         Family History   Problem Relation Age of Onset   • Cancer Father        Social History     Socioeconomic History   • Marital status:      Spouse name: Not on file   • Number of children: Not on file   • Years of education: Not on file   • Highest education level: Not on file   Social Needs   • Financial resource strain: Not on file   • Food insecurity - worry: Not on file   • Food insecurity - inability: Not on file   • Transportation needs - " medical: Not on file   • Transportation needs - non-medical: Not on file   Occupational History   • Not on file   Tobacco Use   • Smoking status: Current Every Day Smoker     Packs/day: 1.00     Years: 45.00     Pack years: 45.00   • Smokeless tobacco: Never Used   • Tobacco comment: PT REPORTS SMOKE 3 CIGARRETES THIS AM   Substance and Sexual Activity   • Alcohol use: No     Frequency: Never   • Drug use: No   • Sexual activity: Defer   Other Topics Concern   • Not on file   Social History Narrative   • Not on file       I have reviewed all of the above social hx, family hx, surgical hx, medications, allergies & ROS and confirm that it is accurate.    No Known Allergies      Current Outpatient Medications:   •  aclidinium bromide (TUDORZA PRESSAIR) 400 MCG/ACT aerosol powder  powder for inhalation, Inhale 1 puff 2 (Two) Times a Day., Disp: , Rfl:   •  albuterol (ACCUNEB) 1.25 MG/3ML nebulizer solution, Take 1 ampule by nebulization Every 6 (Six) Hours As Needed for Wheezing., Disp: , Rfl:   •  albuterol 108 (90 Base) MCG/ACT inhaler, Inhale 2 puffs Every 6 (Six) Hours As Needed for Wheezing., Disp: , Rfl:   •  amLODIPine (NORVASC) 10 MG tablet, Take 10 mg by mouth Daily., Disp: , Rfl:   •  aspirin 81 MG chewable tablet, Chew 81 mg Daily., Disp: , Rfl:   •  atorvastatin (LIPITOR) 40 MG tablet, Take 40 mg by mouth Daily., Disp: , Rfl:   •  cephalexin (KEFLEX) 500 MG capsule, Take 1 capsule by mouth Every 6 (Six) Hours., Disp: 56 capsule, Rfl: 0  •  clindamycin (CLEOCIN) 300 MG capsule, Take 300 mg by mouth 3 (Three) Times a Day., Disp: , Rfl:   •  HM ALLERGY RELIEF 50 MCG/ACT nasal spray, , Disp: , Rfl:   •  hydrochlorothiazide (HYDRODIURIL) 25 MG tablet, Take 25 mg by mouth Daily., Disp: , Rfl:   •  HYDROcodone-acetaminophen (NORCO) 5-325 MG per tablet, Take 1-2 tablets by mouth Every 6 (Six) Hours As Needed for Moderate Pain, Disp: 30 tablet, Rfl: 0  •  losartan (COZAAR) 100 MG tablet, , Disp: , Rfl:   •  losartan  "(COZAAR) 25 MG tablet, Take 25 mg by mouth Daily., Disp: , Rfl:   •  meloxicam (MOBIC) 15 MG tablet, Take 15 mg by mouth Daily., Disp: , Rfl:   •  metoprolol succinate XL (TOPROL-XL) 25 MG 24 hr tablet, , Disp: , Rfl:   •  metoprolol tartrate (LOPRESSOR) 25 MG tablet, Take 25 mg by mouth Daily., Disp: , Rfl:   •  minocycline (MINOCIN,DYNACIN) 100 MG capsule, , Disp: , Rfl:   •  nitroglycerin (NITROSTAT) 0.4 MG SL tablet, Place 0.4 mg under the tongue Every 5 (Five) Minutes As Needed for Chest Pain. Take no more than 3 doses in 15 minutes., Disp: , Rfl:   •  rivaroxaban (XARELTO) 10 MG tablet, Take 10 mg by mouth Daily., Disp: , Rfl:   •  sulfamethoxazole-trimethoprim (BACTRIM DS,SEPTRA DS) 800-160 MG per tablet, Take 1 tablet by mouth 2 (Two) Times a Day., Disp: 28 tablet, Rfl: 0  •  XARELTO 20 MG tablet, , Disp: , Rfl:     Objective   Resp 18   Ht 180.3 cm (71\")   Wt 75.8 kg (167 lb)   BMI 23.29 kg/m²    Physical Exam  Constitutional: Patient is oriented to person, place, and time. Patient appears well-developed and well-nourished.   HENT:Head: Normocephalic and atraumatic.   Eyes: EOM are normal. Pupils are equal, round, and reactive to light.   Neck: Normal range of motion. Neck supple.   Cardiovascular: Normal rate.    Pulmonary/Chest: Effort normal and breath sounds normal.   Abdominal: Soft.   Neurological: Patient is alert and oriented to person, place, and time.   Skin: Skin is warm and dry.   Psychiatric: Patient has a normal mood and affect.   Nursing note and vitals reviewed.       [unfilled]   Right elbow postoperative splint was removed the drain was pulled approximately 10 cc of bloody drainage was expressed from the olecranon bursa the surrounding skin did not demonstrate any signs of cellulitis his forearm is soft and is neurovascularly intact and new sterile dressing was applied and the postoperative fiberglass splint was reapplied.  There was good capillary refill in the fingers good " circulation to hand    Assessment/Plan   Review of Radiographic Studies:    No new imaging done today.      Procedures     Hung was seen today for post-op.    Diagnoses and all orders for this visit:    Septic bursitis of elbow, right       Orthopedic activities reviewed and patient expressed appreciation      Recommendations/Plan:   Work/Activity Status: No use of involved extremity    Patient agreeable to call or return sooner for any concerns.             Impression:  Status post incision and drainage right elbow septic olecranon bursa with culture-positive staph aureus sensitive to Keflex  Plan:  Continue antibiotics continue current splint return on Thursday for dressing change

## 2018-12-04 LAB
BACTERIA SPEC ANAEROBE CULT: NORMAL
BACTERIA SPEC ANAEROBE CULT: NORMAL

## 2018-12-06 ENCOUNTER — OFFICE VISIT (OUTPATIENT)
Dept: ORTHOPEDIC SURGERY | Facility: CLINIC | Age: 63
End: 2018-12-06

## 2018-12-06 VITALS — RESPIRATION RATE: 18 BRPM | BODY MASS INDEX: 23.38 KG/M2 | WEIGHT: 167 LBS | HEIGHT: 71 IN

## 2018-12-06 DIAGNOSIS — M71.121 SEPTIC BURSITIS OF ELBOW, RIGHT: Primary | ICD-10-CM

## 2018-12-06 PROCEDURE — 99024 POSTOP FOLLOW-UP VISIT: CPT | Performed by: ORTHOPAEDIC SURGERY

## 2018-12-06 NOTE — PROGRESS NOTES
"Subjective   Patient ID: Hung Joaquin is a 63 y.o. male  Post-op of the Right Elbow (Patient is here today for a dressing change on his I&D on 11/30/18. He denies any pain.)             History of Present Illness    He returns for wound check no fever pain persists in the anterolateral upper forearm consistent with his prior fall and injury, minimal drainage from the surgical incisional area.  Tolerating his antibiotics well with no adverse reactions    Review of Systems   Constitutional: Negative for fever.   HENT: Negative for voice change.    Eyes: Negative for visual disturbance.   Respiratory: Negative for shortness of breath.    Cardiovascular: Negative for chest pain.   Gastrointestinal: Negative for abdominal distention and abdominal pain.   Genitourinary: Negative for dysuria.   Musculoskeletal: Negative for arthralgias, gait problem and joint swelling.   Skin: Negative for rash.   Neurological: Negative for speech difficulty.   Hematological: Does not bruise/bleed easily.   Psychiatric/Behavioral: Negative for confusion.       Past Medical History:   Diagnosis Date   • Arthritis    • Emphysema lung (CMS/HCC)    • Enlarged prostate    • GERD (gastroesophageal reflux disease)    • Hyperlipidemia    • Hypertension    • Jaundice 1973        Past Surgical History:   Procedure Laterality Date   • CARDIAC CATHETERIZATION  2008    \"IT WAS OK\"   • KNEE SURGERY     • PROSTATE SURGERY         Family History   Problem Relation Age of Onset   • Cancer Father        Social History     Socioeconomic History   • Marital status:      Spouse name: Not on file   • Number of children: Not on file   • Years of education: Not on file   • Highest education level: Not on file   Social Needs   • Financial resource strain: Not on file   • Food insecurity - worry: Not on file   • Food insecurity - inability: Not on file   • Transportation needs - medical: Not on file   • Transportation needs - non-medical: Not on file "   Occupational History   • Not on file   Tobacco Use   • Smoking status: Current Every Day Smoker     Packs/day: 1.00     Years: 45.00     Pack years: 45.00   • Smokeless tobacco: Never Used   • Tobacco comment: PT REPORTS SMOKE 3 CIGARRETES THIS AM   Substance and Sexual Activity   • Alcohol use: No     Frequency: Never   • Drug use: No   • Sexual activity: Defer   Other Topics Concern   • Not on file   Social History Narrative   • Not on file       I have reviewed all of the above social hx, family hx, surgical hx, medications, allergies & ROS and confirm that it is accurate.    No Known Allergies      Current Outpatient Medications:   •  aclidinium bromide (TUDORZA PRESSAIR) 400 MCG/ACT aerosol powder  powder for inhalation, Inhale 1 puff 2 (Two) Times a Day., Disp: , Rfl:   •  albuterol (ACCUNEB) 1.25 MG/3ML nebulizer solution, Take 1 ampule by nebulization Every 6 (Six) Hours As Needed for Wheezing., Disp: , Rfl:   •  albuterol 108 (90 Base) MCG/ACT inhaler, Inhale 2 puffs Every 6 (Six) Hours As Needed for Wheezing., Disp: , Rfl:   •  amLODIPine (NORVASC) 10 MG tablet, Take 10 mg by mouth Daily., Disp: , Rfl:   •  aspirin 81 MG chewable tablet, Chew 81 mg Daily., Disp: , Rfl:   •  atorvastatin (LIPITOR) 40 MG tablet, Take 40 mg by mouth Daily., Disp: , Rfl:   •  cephalexin (KEFLEX) 500 MG capsule, Take 1 capsule by mouth Every 6 (Six) Hours., Disp: 56 capsule, Rfl: 0  •  clindamycin (CLEOCIN) 300 MG capsule, Take 300 mg by mouth 3 (Three) Times a Day., Disp: , Rfl:   •  HM ALLERGY RELIEF 50 MCG/ACT nasal spray, , Disp: , Rfl:   •  hydrochlorothiazide (HYDRODIURIL) 25 MG tablet, Take 25 mg by mouth Daily., Disp: , Rfl:   •  HYDROcodone-acetaminophen (NORCO) 5-325 MG per tablet, Take 1-2 tablets by mouth Every 6 (Six) Hours As Needed for Moderate Pain, Disp: 30 tablet, Rfl: 0  •  losartan (COZAAR) 100 MG tablet, , Disp: , Rfl:   •  losartan (COZAAR) 25 MG tablet, Take 25 mg by mouth Daily., Disp: , Rfl:   •   "meloxicam (MOBIC) 15 MG tablet, Take 15 mg by mouth Daily., Disp: , Rfl:   •  metoprolol succinate XL (TOPROL-XL) 25 MG 24 hr tablet, , Disp: , Rfl:   •  metoprolol tartrate (LOPRESSOR) 25 MG tablet, Take 25 mg by mouth Daily., Disp: , Rfl:   •  minocycline (MINOCIN,DYNACIN) 100 MG capsule, , Disp: , Rfl:   •  nitroglycerin (NITROSTAT) 0.4 MG SL tablet, Place 0.4 mg under the tongue Every 5 (Five) Minutes As Needed for Chest Pain. Take no more than 3 doses in 15 minutes., Disp: , Rfl:   •  rivaroxaban (XARELTO) 10 MG tablet, Take 10 mg by mouth Daily., Disp: , Rfl:   •  sulfamethoxazole-trimethoprim (BACTRIM DS,SEPTRA DS) 800-160 MG per tablet, Take 1 tablet by mouth 2 (Two) Times a Day., Disp: 28 tablet, Rfl: 0  •  XARELTO 20 MG tablet, , Disp: , Rfl:     Objective   Resp 18   Ht 180.3 cm (71\")   Wt 75.8 kg (167 lb)   BMI 23.29 kg/m²    Physical Exam  Constitutional: Patient is oriented to person, place, and time. Patient appears well-developed and well-nourished.   HENT:Head: Normocephalic and atraumatic.   Eyes: EOM are normal. Pupils are equal, round, and reactive to light.   Neck: Normal range of motion. Neck supple.   Cardiovascular: Normal rate.    Pulmonary/Chest: Effort normal and breath sounds normal.   Abdominal: Soft.   Neurological: Patient is alert and oriented to person, place, and time.   Skin: Skin is warm and dry.   Psychiatric: Patient has a normal mood and affect.   Nursing note and vitals reviewed.       [unfilled]   Right surgical incisional area with minimal drainage mostly bloody no recurrent purulence no sign of cellulitis tenderness persists over the anterolateral epicondylar area consistent with his area of prior contusion no pain at the radiocapitellar area no pain with supination pronation form is neurovascularly intact    Assessment/Plan   Review of Radiographic Studies:    No new imaging done today.      Procedures     Hung was seen today for post-op.    Diagnoses and all orders " for this visit:    Septic bursitis of elbow, right       Use brace as instructed      Recommendations/Plan:   Work/Activity Status: No use of involved extremity    Patient agreeable to call or return sooner for any concerns.             Impression:  Status post I&D right septic olecranon bursa  Plan:  Return next week for suture removal refer to therapy that time, complete course of Keflex

## 2018-12-11 ENCOUNTER — OFFICE VISIT (OUTPATIENT)
Dept: ORTHOPEDIC SURGERY | Facility: CLINIC | Age: 63
End: 2018-12-11

## 2018-12-11 VITALS — RESPIRATION RATE: 18 BRPM | HEIGHT: 71 IN | WEIGHT: 167 LBS | BODY MASS INDEX: 23.38 KG/M2

## 2018-12-11 DIAGNOSIS — M71.121 SEPTIC BURSITIS OF ELBOW, RIGHT: Primary | ICD-10-CM

## 2018-12-11 PROCEDURE — 99024 POSTOP FOLLOW-UP VISIT: CPT | Performed by: ORTHOPAEDIC SURGERY

## 2018-12-11 NOTE — PROGRESS NOTES
"Subjective   Patient ID: Hung Joaquin is a 63 y.o. male  Post-op of the Right Elbow (Incision drainage irrigation debridement septic right elbow olecranon bursa, placement of drain - Right 11/30/18)             History of Present Illness  He returns for suture removal right elbow has had no fever no significant drainage no change in medical health      Review of Systems   Constitutional: Negative for fever.   HENT: Negative for voice change.    Eyes: Negative for visual disturbance.   Respiratory: Negative for shortness of breath.    Cardiovascular: Negative for chest pain.   Gastrointestinal: Negative for abdominal distention and abdominal pain.   Genitourinary: Negative for dysuria.   Musculoskeletal: Positive for arthralgias. Negative for gait problem and joint swelling.   Skin: Negative for rash.   Neurological: Negative for speech difficulty.   Hematological: Does not bruise/bleed easily.   Psychiatric/Behavioral: Negative for confusion.       Past Medical History:   Diagnosis Date   • Arthritis    • Emphysema lung (CMS/HCC)    • Enlarged prostate    • GERD (gastroesophageal reflux disease)    • Hyperlipidemia    • Hypertension    • Jaundice 1973        Past Surgical History:   Procedure Laterality Date   • CARDIAC CATHETERIZATION  2008    \"IT WAS OK\"   • KNEE SURGERY     • PROSTATE SURGERY         Family History   Problem Relation Age of Onset   • Cancer Father        Social History     Socioeconomic History   • Marital status:      Spouse name: Not on file   • Number of children: Not on file   • Years of education: Not on file   • Highest education level: Not on file   Social Needs   • Financial resource strain: Not on file   • Food insecurity - worry: Not on file   • Food insecurity - inability: Not on file   • Transportation needs - medical: Not on file   • Transportation needs - non-medical: Not on file   Occupational History   • Not on file   Tobacco Use   • Smoking status: Current Every Day " Smoker     Packs/day: 1.00     Years: 45.00     Pack years: 45.00   • Smokeless tobacco: Never Used   • Tobacco comment: PT REPORTS SMOKE 3 CIGARRETES THIS AM   Substance and Sexual Activity   • Alcohol use: No     Frequency: Never   • Drug use: No   • Sexual activity: Defer   Other Topics Concern   • Not on file   Social History Narrative   • Not on file       I have reviewed all of the above social hx, family hx, surgical hx, medications, allergies & ROS and confirm that it is accurate.    No Known Allergies      Current Outpatient Medications:   •  aclidinium bromide (TUDORZA PRESSAIR) 400 MCG/ACT aerosol powder  powder for inhalation, Inhale 1 puff 2 (Two) Times a Day., Disp: , Rfl:   •  albuterol (ACCUNEB) 1.25 MG/3ML nebulizer solution, Take 1 ampule by nebulization Every 6 (Six) Hours As Needed for Wheezing., Disp: , Rfl:   •  albuterol 108 (90 Base) MCG/ACT inhaler, Inhale 2 puffs Every 6 (Six) Hours As Needed for Wheezing., Disp: , Rfl:   •  amLODIPine (NORVASC) 10 MG tablet, Take 10 mg by mouth Daily., Disp: , Rfl:   •  aspirin 81 MG chewable tablet, Chew 81 mg Daily., Disp: , Rfl:   •  atorvastatin (LIPITOR) 40 MG tablet, Take 40 mg by mouth Daily., Disp: , Rfl:   •  cephalexin (KEFLEX) 500 MG capsule, Take 1 capsule by mouth Every 6 (Six) Hours., Disp: 56 capsule, Rfl: 0  •  clindamycin (CLEOCIN) 300 MG capsule, Take 300 mg by mouth 3 (Three) Times a Day., Disp: , Rfl:   •  HM ALLERGY RELIEF 50 MCG/ACT nasal spray, , Disp: , Rfl:   •  hydrochlorothiazide (HYDRODIURIL) 25 MG tablet, Take 25 mg by mouth Daily., Disp: , Rfl:   •  HYDROcodone-acetaminophen (NORCO) 5-325 MG per tablet, Take 1-2 tablets by mouth Every 6 (Six) Hours As Needed for Moderate Pain, Disp: 30 tablet, Rfl: 0  •  losartan (COZAAR) 100 MG tablet, , Disp: , Rfl:   •  losartan (COZAAR) 25 MG tablet, Take 25 mg by mouth Daily., Disp: , Rfl:   •  meloxicam (MOBIC) 15 MG tablet, Take 15 mg by mouth Daily., Disp: , Rfl:   •  metoprolol  "succinate XL (TOPROL-XL) 25 MG 24 hr tablet, , Disp: , Rfl:   •  metoprolol tartrate (LOPRESSOR) 25 MG tablet, Take 25 mg by mouth Daily., Disp: , Rfl:   •  minocycline (MINOCIN,DYNACIN) 100 MG capsule, , Disp: , Rfl:   •  nitroglycerin (NITROSTAT) 0.4 MG SL tablet, Place 0.4 mg under the tongue Every 5 (Five) Minutes As Needed for Chest Pain. Take no more than 3 doses in 15 minutes., Disp: , Rfl:   •  rivaroxaban (XARELTO) 10 MG tablet, Take 10 mg by mouth Daily., Disp: , Rfl:   •  sulfamethoxazole-trimethoprim (BACTRIM DS,SEPTRA DS) 800-160 MG per tablet, Take 1 tablet by mouth 2 (Two) Times a Day., Disp: 28 tablet, Rfl: 0  •  XARELTO 20 MG tablet, , Disp: , Rfl:     Objective   Resp 18   Ht 180.3 cm (71\")   Wt 75.8 kg (167 lb)   BMI 23.29 kg/m²    Physical Exam  Constitutional: Patient is oriented to person, place, and time. Patient appears well-developed and well-nourished.   HENT:Head: Normocephalic and atraumatic.   Eyes: EOM are normal. Pupils are equal, round, and reactive to light.   Neck: Normal range of motion. Neck supple.   Cardiovascular: Normal rate.    Pulmonary/Chest: Effort normal and breath sounds normal.   Abdominal: Soft.   Neurological: Patient is alert and oriented to person, place, and time.   Skin: Skin is warm and dry.   Psychiatric: Patient has a normal mood and affect.   Nursing note and vitals reviewed.       [unfilled]   Right elbow incisional area is well-healed sutures removed Steri-Strips applied the arms neurovascularly intact    Assessment/Plan   Review of Radiographic Studies:    No new imaging done today.      Procedures     Hung was seen today for post-op.    Diagnoses and all orders for this visit:    Septic bursitis of elbow, right       Orthopedic activities reviewed and patient expressed appreciation      Recommendations/Plan:   Work/Activity Status: No use of involved extremity    Patient agreeable to call or return sooner for any concerns.     "         Impression:  Status post incision and drainage debridement septic right olecranon bursa  Plan:  Continue antibiotics recheck in one week refer to therapy at that time

## 2018-12-18 ENCOUNTER — OFFICE VISIT (OUTPATIENT)
Dept: ORTHOPEDIC SURGERY | Facility: CLINIC | Age: 63
End: 2018-12-18

## 2018-12-18 VITALS — RESPIRATION RATE: 18 BRPM | BODY MASS INDEX: 23.38 KG/M2 | WEIGHT: 167 LBS | HEIGHT: 71 IN

## 2018-12-18 DIAGNOSIS — M71.121 SEPTIC BURSITIS OF ELBOW, RIGHT: Primary | ICD-10-CM

## 2018-12-18 PROCEDURE — 99024 POSTOP FOLLOW-UP VISIT: CPT | Performed by: ORTHOPAEDIC SURGERY

## 2018-12-18 RX ORDER — ATORVASTATIN CALCIUM 80 MG/1
80 TABLET, FILM COATED ORAL DAILY
COMMUNITY
Start: 2018-12-08

## 2018-12-18 NOTE — PROGRESS NOTES
"Subjective   Patient ID: Hung Joaquin is a 63 y.o. male  Post-op and Pain of the Right Elbow (Patient is here today for a postoperative visit, he states his arm is feverish, swollen, and sore. His pain level is 4/10.)             History of Present Illness  Pain has resolved at the right elbow no further drainage or fever he is due to finish his last dose of Keflex today, he would like to return to his work activities, has noticed a little persistent swelling at the olecranon bursal area with minimal pain minimal redness and no drainage.      Review of Systems   Constitutional: Negative for fever.   HENT: Negative for voice change.    Eyes: Negative for visual disturbance.   Respiratory: Negative for shortness of breath.    Cardiovascular: Negative for chest pain.   Gastrointestinal: Negative for abdominal distention and abdominal pain.   Genitourinary: Negative for dysuria.   Musculoskeletal: Positive for arthralgias. Negative for gait problem and joint swelling.   Skin: Negative for rash.   Neurological: Negative for speech difficulty.   Hematological: Does not bruise/bleed easily.   Psychiatric/Behavioral: Negative for confusion.       Past Medical History:   Diagnosis Date   • Arthritis    • Emphysema lung (CMS/HCC)    • Enlarged prostate    • GERD (gastroesophageal reflux disease)    • Hyperlipidemia    • Hypertension    • Jaundice 1973        Past Surgical History:   Procedure Laterality Date   • CARDIAC CATHETERIZATION  2008    \"IT WAS OK\"   • KNEE SURGERY     • PROSTATE SURGERY         Family History   Problem Relation Age of Onset   • Cancer Father        Social History     Socioeconomic History   • Marital status:      Spouse name: Not on file   • Number of children: Not on file   • Years of education: Not on file   • Highest education level: Not on file   Social Needs   • Financial resource strain: Not on file   • Food insecurity - worry: Not on file   • Food insecurity - inability: Not on " file   • Transportation needs - medical: Not on file   • Transportation needs - non-medical: Not on file   Occupational History   • Not on file   Tobacco Use   • Smoking status: Current Every Day Smoker     Packs/day: 1.00     Years: 45.00     Pack years: 45.00   • Smokeless tobacco: Never Used   • Tobacco comment: PT REPORTS SMOKE 3 CIGARRETES THIS AM   Substance and Sexual Activity   • Alcohol use: No     Frequency: Never   • Drug use: No   • Sexual activity: Defer   Other Topics Concern   • Not on file   Social History Narrative   • Not on file       I have reviewed all of the above social hx, family hx, surgical hx, medications, allergies & ROS and confirm that it is accurate.    No Known Allergies      Current Outpatient Medications:   •  aclidinium bromide (TUDORZA PRESSAIR) 400 MCG/ACT aerosol powder  powder for inhalation, Inhale 1 puff 2 (Two) Times a Day., Disp: , Rfl:   •  albuterol (ACCUNEB) 1.25 MG/3ML nebulizer solution, Take 1 ampule by nebulization Every 6 (Six) Hours As Needed for Wheezing., Disp: , Rfl:   •  albuterol 108 (90 Base) MCG/ACT inhaler, Inhale 2 puffs Every 6 (Six) Hours As Needed for Wheezing., Disp: , Rfl:   •  amLODIPine (NORVASC) 10 MG tablet, Take 10 mg by mouth Daily., Disp: , Rfl:   •  aspirin 81 MG chewable tablet, Chew 81 mg Daily., Disp: , Rfl:   •  atorvastatin (LIPITOR) 40 MG tablet, Take 40 mg by mouth Daily., Disp: , Rfl:   •  atorvastatin (LIPITOR) 80 MG tablet, , Disp: , Rfl:   •  cephalexin (KEFLEX) 500 MG capsule, Take 1 capsule by mouth Every 6 (Six) Hours., Disp: 56 capsule, Rfl: 0  •  clindamycin (CLEOCIN) 300 MG capsule, Take 300 mg by mouth 3 (Three) Times a Day., Disp: , Rfl:   •  HM ALLERGY RELIEF 50 MCG/ACT nasal spray, , Disp: , Rfl:   •  hydrochlorothiazide (HYDRODIURIL) 25 MG tablet, Take 25 mg by mouth Daily., Disp: , Rfl:   •  HYDROcodone-acetaminophen (NORCO) 5-325 MG per tablet, Take 1-2 tablets by mouth Every 6 (Six) Hours As Needed for Moderate Pain,  "Disp: 30 tablet, Rfl: 0  •  losartan (COZAAR) 100 MG tablet, , Disp: , Rfl:   •  losartan (COZAAR) 25 MG tablet, Take 25 mg by mouth Daily., Disp: , Rfl:   •  meloxicam (MOBIC) 15 MG tablet, Take 15 mg by mouth Daily., Disp: , Rfl:   •  metoprolol succinate XL (TOPROL-XL) 25 MG 24 hr tablet, , Disp: , Rfl:   •  metoprolol tartrate (LOPRESSOR) 25 MG tablet, Take 25 mg by mouth Daily., Disp: , Rfl:   •  minocycline (MINOCIN,DYNACIN) 100 MG capsule, , Disp: , Rfl:   •  nitroglycerin (NITROSTAT) 0.4 MG SL tablet, Place 0.4 mg under the tongue Every 5 (Five) Minutes As Needed for Chest Pain. Take no more than 3 doses in 15 minutes., Disp: , Rfl:   •  rivaroxaban (XARELTO) 10 MG tablet, Take 10 mg by mouth Daily., Disp: , Rfl:   •  sulfamethoxazole-trimethoprim (BACTRIM DS,SEPTRA DS) 800-160 MG per tablet, Take 1 tablet by mouth 2 (Two) Times a Day., Disp: 28 tablet, Rfl: 0  •  XARELTO 20 MG tablet, , Disp: , Rfl:     Objective   Resp 18   Ht 180.3 cm (71\")   Wt 75.8 kg (167 lb)   BMI 23.29 kg/m²    Physical Exam  Constitutional: Patient is oriented to person, place, and time. Patient appears well-developed and well-nourished.   HENT:Head: Normocephalic and atraumatic.   Eyes: EOM are normal. Pupils are equal, round, and reactive to light.   Neck: Normal range of motion. Neck supple.   Cardiovascular: Normal rate.    Pulmonary/Chest: Effort normal and breath sounds normal.   Abdominal: Soft.   Neurological: Patient is alert and oriented to person, place, and time.   Skin: Skin is warm and dry.   Psychiatric: Patient has a normal mood and affect.   Nursing note and vitals reviewed.       [unfilled]    Right elbow with minimal erythema some fluctuance at the olecranon bursa consistent with resolving hematoma, no pain at the radiocapitellar area, incisional area is well-healed with no sign of significant erythema or drainage.  Forarm is soft arm is neurovascularly intact.    Assessment/Plan   Review of Radiographic " Studies:    No new imaging done today.      Procedures     Hung was seen today for post-op and pain.    Diagnoses and all orders for this visit:    Septic bursitis of elbow, right       Orthopedic activities reviewed and patient expressed appreciation      Recommendations/Plan:   Work/Activity Status: Return to regular work activities in 2 weeks    Patient agreeable to call or return sooner for any concerns.             Impression:  Status post incision and drainage debridement right olecranon bursa  Plan:  Return to normal work duties in 2 weeks return to see me as needed--advised observation no further surgical treatment necessary at this time

## 2018-12-29 LAB
BACTERIA SPEC AEROBE CULT: NORMAL
BACTERIA SPEC AEROBE CULT: NORMAL
FUNGUS SPEC CULT: NORMAL
FUNGUS SPEC FUNGUS STN: NORMAL
FUNGUS SPEC FUNGUS STN: NORMAL

## 2019-02-28 ENCOUNTER — OFFICE VISIT (OUTPATIENT)
Dept: ORTHOPEDIC SURGERY | Facility: CLINIC | Age: 64
End: 2019-02-28

## 2019-02-28 VITALS — BODY MASS INDEX: 23.66 KG/M2 | WEIGHT: 169 LBS | RESPIRATION RATE: 18 BRPM | HEIGHT: 71 IN

## 2019-02-28 DIAGNOSIS — M70.21 OLECRANON BURSITIS OF RIGHT ELBOW: ICD-10-CM

## 2019-02-28 DIAGNOSIS — M25.521 ARTHRALGIA OF RIGHT ELBOW: Primary | ICD-10-CM

## 2019-02-28 PROCEDURE — 99024 POSTOP FOLLOW-UP VISIT: CPT | Performed by: ORTHOPAEDIC SURGERY

## 2019-02-28 NOTE — PROGRESS NOTES
"Subjective   Patient ID: Hung Joaquin is a 63 y.o. male  Follow-up and Pain of the Right Elbow (Patient states his elbow is still sore to the touch, it feels like bees stinging him, and still swollen. He states this has never got better since his surgery on 11/30/18.)             History of Present Illness  Right dominant elbow is painful with use,  feels like a stinging sensation over the olecranon bursal area 2-3 weeks ago was using a sharp knife to cut  [employer] material when he felt some onset of pain in the right proximal forearm and  has difficulty using a hammer overhead and with heavy  of the hand this aggravates his proximal forearm pain.  Denies any real open wounds drainage increasing redness or change in range of motion of the elbow.  Denies fever or other arthralgias      Review of Systems   Constitutional: Negative for fever.   HENT: Negative for voice change.    Eyes: Negative for visual disturbance.   Respiratory: Negative for shortness of breath.    Cardiovascular: Negative for chest pain.   Gastrointestinal: Negative for abdominal distention and abdominal pain.   Genitourinary: Negative for dysuria.   Musculoskeletal: Positive for arthralgias. Negative for gait problem and joint swelling.   Skin: Negative for rash.   Neurological: Negative for speech difficulty.   Hematological: Does not bruise/bleed easily.   Psychiatric/Behavioral: Negative for confusion.       Past Medical History:   Diagnosis Date   • Arthritis    • Emphysema lung (CMS/HCC)    • Enlarged prostate    • GERD (gastroesophageal reflux disease)    • Hyperlipidemia    • Hypertension    • Jaundice 1973        Past Surgical History:   Procedure Laterality Date   • CARDIAC CATHETERIZATION  2008    \"IT WAS OK\"   • ELBOW DEBRIDEMENT Right 11/30/2018    Procedure: Incision drainage irrigation debridement septic right elbow olecranon bursa, placement of drain;  Surgeon: John Mcintyre MD;  Location: [facility];  Service: " Orthopedics   • KNEE SURGERY     • PROSTATE SURGERY         Family History   Problem Relation Age of Onset   • Cancer Father        Social History     Socioeconomic History   • Marital status:      Spouse name: Not on file   • Number of children: Not on file   • Years of education: Not on file   • Highest education level: Not on file   Social Needs   • Financial resource strain: Not on file   • Food insecurity - worry: Not on file   • Food insecurity - inability: Not on file   • Transportation needs - medical: Not on file   • Transportation needs - non-medical: Not on file   Occupational History   • Not on file   Tobacco Use   • Smoking status: Current Every Day Smoker     Packs/day: 1.00     Years: 45.00     Pack years: 45.00   • Smokeless tobacco: Never Used   • Tobacco comment: PT REPORTS SMOKE 3 CIGARRETES THIS AM   Substance and Sexual Activity   • Alcohol use: No     Frequency: Never   • Drug use: No   • Sexual activity: Defer   Other Topics Concern   • Not on file   Social History Narrative   • Not on file       I have reviewed all of the above social hx, family hx, surgical hx, medications, allergies & ROS and confirm that it is accurate.    No Known Allergies      Current Outpatient Medications:   •  aclidinium bromide (TUDORZA PRESSAIR) 400 MCG/ACT aerosol powder  powder for inhalation, Inhale 1 puff 2 (Two) Times a Day., Disp: , Rfl:   •  albuterol (ACCUNEB) 1.25 MG/3ML nebulizer solution, Take 1 ampule by nebulization Every 6 (Six) Hours As Needed for Wheezing., Disp: , Rfl:   •  albuterol 108 (90 Base) MCG/ACT inhaler, Inhale 2 puffs Every 6 (Six) Hours As Needed for Wheezing., Disp: , Rfl:   •  amLODIPine (NORVASC) 10 MG tablet, Take 10 mg by mouth Daily., Disp: , Rfl:   •  aspirin 81 MG chewable tablet, Chew 81 mg Daily., Disp: , Rfl:   •  aspirin 81 MG oral suspension, aspirin, Disp: , Rfl:   •  atorvastatin (LIPITOR) 40 MG tablet, Take 40 mg by mouth Daily., Disp: , Rfl:   •  atorvastatin  "(LIPITOR) 80 MG tablet, , Disp: , Rfl:   •  cephalexin (KEFLEX) 500 MG capsule, Take 1 capsule by mouth Every 6 (Six) Hours., Disp: 56 capsule, Rfl: 0  •  clindamycin (CLEOCIN) 300 MG capsule, Take 300 mg by mouth 3 (Three) Times a Day., Disp: , Rfl:   •  HM ALLERGY RELIEF 50 MCG/ACT nasal spray, , Disp: , Rfl:   •  hydrochlorothiazide (HYDRODIURIL) 25 MG tablet, Take 25 mg by mouth Daily., Disp: , Rfl:   •  HYDROcodone-acetaminophen (NORCO) 5-325 MG per tablet, Take 1-2 tablets by mouth Every 6 (Six) Hours As Needed for Moderate Pain, Disp: 30 tablet, Rfl: 0  •  losartan (COZAAR) 100 MG tablet, , Disp: , Rfl:   •  losartan (COZAAR) 25 MG tablet, Take 25 mg by mouth Daily., Disp: , Rfl:   •  meloxicam (MOBIC) 15 MG tablet, Take 15 mg by mouth Daily., Disp: , Rfl:   •  metoprolol succinate XL (TOPROL-XL) 25 MG 24 hr tablet, , Disp: , Rfl:   •  metoprolol tartrate (LOPRESSOR) 25 MG tablet, Take 25 mg by mouth Daily., Disp: , Rfl:   •  minocycline (MINOCIN,DYNACIN) 100 MG capsule, , Disp: , Rfl:   •  nitroglycerin (NITROSTAT) 0.4 MG SL tablet, Place 0.4 mg under the tongue Every 5 (Five) Minutes As Needed for Chest Pain. Take no more than 3 doses in 15 minutes., Disp: , Rfl:   •  rivaroxaban (XARELTO) 10 MG tablet, Take 10 mg by mouth Daily., Disp: , Rfl:   •  sulfamethoxazole-trimethoprim (BACTRIM DS,SEPTRA DS) 800-160 MG per tablet, Take 1 tablet by mouth 2 (Two) Times a Day., Disp: 28 tablet, Rfl: 0  •  XARELTO 20 MG tablet, , Disp: , Rfl:     Objective   Resp 18   Ht 180.3 cm (71\")   Wt 76.7 kg (169 lb)   BMI 23.57 kg/m²    Physical Exam  Constitutional: Patient is oriented to person, place, and time. Patient appears well-developed and well-nourished.   HENT:Head: Normocephalic and atraumatic.   Eyes: EOM are normal. Pupils are equal, round, and reactive to light.   Neck: Normal range of motion. Neck supple.   Cardiovascular: Normal rate.    Pulmonary/Chest: Effort normal and breath sounds normal.   Abdominal: " Soft.   Neurological: Patient is alert and oriented to person, place, and time.   Skin: Skin is warm and dry.   Psychiatric: Patient has a normal mood and affect.   Nursing note and vitals reviewed.       [unfilled]   Right elbow: Some prominence of the olecranon bursa minimal tenderness very slight erythema no focal soft tissue collections attention -10 supination pronation full no instability minimal pain with range of motion  strength well preserved sensory motor function intact to the hand.    Assessment/Plan   Review of Radiographic Studies:    Right elbow radiographs confirm what appear to be an osteolytic process involving the olecranon bursa minimal soft tissue swelling is seen no sign of acute fracture      Procedures     Hung was seen today for follow-up and pain.    Diagnoses and all orders for this visit:    Arthralgia of right elbow  -     XR Elbow 3+ View Right  -     Ambulatory Referral to Hand Surgery       Orthopedic activities reviewed and patient expressed appreciation and Risk, benefits, and merits of treatment alternatives reviewed with the patient and questions answered      Recommendations/Plan:   Work/Activity Status: No strenuous activity    Patient agreeable to call or return sooner for any concerns.             Impression:  Right elbow pain history of septic olecranon bursitis status post incision and drainage November 2018 now with changes at the olecranon suggestive of osteomyelitis  Plan:  Due to significant swelling pain and radiographic findings  suggesting osteomyelitis, referral will be made to  for further evaluation and treatment with the hand and upper extremity service

## 2020-09-10 ENCOUNTER — OFFICE VISIT (OUTPATIENT)
Dept: GASTROENTEROLOGY | Facility: CLINIC | Age: 65
End: 2020-09-10

## 2020-09-10 VITALS
HEART RATE: 53 BPM | SYSTOLIC BLOOD PRESSURE: 116 MMHG | RESPIRATION RATE: 18 BRPM | BODY MASS INDEX: 22.82 KG/M2 | WEIGHT: 163 LBS | TEMPERATURE: 97.7 F | HEIGHT: 71 IN | DIASTOLIC BLOOD PRESSURE: 70 MMHG

## 2020-09-10 DIAGNOSIS — Z01.818 PREOP TESTING: Primary | ICD-10-CM

## 2020-09-10 DIAGNOSIS — R19.5 POSITIVE COLORECTAL CANCER SCREENING USING DNA-BASED STOOL TEST: Primary | ICD-10-CM

## 2020-09-10 PROCEDURE — 99203 OFFICE O/P NEW LOW 30 MIN: CPT | Performed by: NURSE PRACTITIONER

## 2020-09-10 RX ORDER — SODIUM CHLORIDE 9 MG/ML
70 INJECTION, SOLUTION INTRAVENOUS CONTINUOUS PRN
Status: CANCELLED | OUTPATIENT
Start: 2020-10-05

## 2020-09-10 RX ORDER — BISACODYL 5 MG/1
TABLET, DELAYED RELEASE ORAL
Qty: 4 TABLET | Refills: 0 | Status: SHIPPED | OUTPATIENT
Start: 2020-09-10 | End: 2020-10-05 | Stop reason: HOSPADM

## 2020-09-10 NOTE — PATIENT INSTRUCTIONS
1. Colonoscopy: Description of the procedure, risks, benefits, alternatives and options, including nonoperative options, were discussed with the patient in detail. The patient understands and wishes to proceed.  2. COVID-19 testing prior to procedure. The patient will need to self-quarantine after testing until the procedure. Instructions given to patient.

## 2020-09-10 NOTE — PROGRESS NOTES
"     New Patient Consult      Date: 09/10/2020   Patient Name: Hung Joaquin  MRN: [id number]  : 1955     Primary Care Provider: Gustavo Loyd MD    Chief Complaint   Patient presents with   • Colon Cancer Screening     History of Present Illness: Hung Joaquin is a 65 y.o. male who is here today to establish care with Gastroenterology for colonoscopy.    The patient had positive Cologuard test 8/3/2020. The patient denies recent change in bowel habits. There is no diarrhea or constipation. There is no history of abdominal pain. There is no history of overt GI bleed (hematemesis melena or hematochezia). The patient denies nausea or vomiting. There is a history of occasional heartburn that is well controlled with TUMs if needed. The patient denies dysphagia or odynophagia. There is no history of recent significant weight loss. There is no history of liver disease in the past. There is no family history of GI malignancy. The patient has not had a colonoscopy in the past.    Subjective      Past Medical History:   Diagnosis Date   • Arthritis    • Emphysema lung (CMS/HCC)    • Enlarged prostate    • GERD (gastroesophageal reflux disease)    • Hyperlipidemia    • Hypertension    • Jaundice    • Knee pain      Past Surgical History:   Procedure Laterality Date   • CARDIAC CATHETERIZATION      \"IT WAS OK\"   • ELBOW DEBRIDEMENT Right 2018    Procedure: Incision drainage irrigation debridement septic right elbow olecranon bursa, placement of drain;  Surgeon: John Mcintyre MD;  Location: [facility];  Service: Orthopedics   • KNEE SURGERY     • PROSTATE SURGERY       Family History   Problem Relation Age of Onset   • Lung cancer Father    • Colon cancer Neg Hx      Social History     Socioeconomic History   • Marital status:      Spouse name: Not on file   • Number of children: Not on file   • Years of education: Not on file   • Highest education level: Not on file   Tobacco " Use   • Smoking status: Current Every Day Smoker     Packs/day: 1.00     Years: 45.00     Pack years: 45.00     Types: Cigarettes   • Smokeless tobacco: Never Used   • Tobacco comment: PT REPORTS SMOKE 3 CIGARRETES THIS AM   Substance and Sexual Activity   • Alcohol use: No     Frequency: Never   • Drug use: No   • Sexual activity: Defer       Current Outpatient Medications:   •  aclidinium bromide (TUDORZA PRESSAIR) 400 MCG/ACT aerosol powder  powder for inhalation, Inhale 1 puff 2 (Two) Times a Day., Disp: , Rfl:   •  albuterol (ACCUNEB) 1.25 MG/3ML nebulizer solution, Take 1 ampule by nebulization Every 6 (Six) Hours As Needed for Wheezing., Disp: , Rfl:   •  albuterol 108 (90 Base) MCG/ACT inhaler, Inhale 2 puffs Every 6 (Six) Hours As Needed for Wheezing., Disp: , Rfl:   •  amLODIPine (NORVASC) 10 MG tablet, Take 5 mg by mouth Daily., Disp: , Rfl:   •  aspirin 81 MG chewable tablet, Chew 81 mg Daily., Disp: , Rfl:   •  atorvastatin (LIPITOR) 80 MG tablet, , Disp: , Rfl:   •  HM ALLERGY RELIEF 50 MCG/ACT nasal spray, , Disp: , Rfl:   •  hydrochlorothiazide (HYDRODIURIL) 25 MG tablet, Take 25 mg by mouth Daily., Disp: , Rfl:   •  losartan (COZAAR) 100 MG tablet, , Disp: , Rfl:   •  meloxicam (MOBIC) 15 MG tablet, Take 15 mg by mouth Daily., Disp: , Rfl:   •  metoprolol tartrate (LOPRESSOR) 25 MG tablet, Take 25 mg by mouth Daily., Disp: , Rfl:   •  nitroglycerin (NITROSTAT) 0.4 MG SL tablet, Place 0.4 mg under the tongue Every 5 (Five) Minutes As Needed for Chest Pain. Take no more than 3 doses in 15 minutes., Disp: , Rfl:   •  XARELTO 20 MG tablet, , Disp: , Rfl:   •  bisacodyl (DULCOLAX) 5 MG EC tablet, Take as directed for colon prep, Disp: 4 tablet, Rfl: 0  •  polyethylene glycol (GoLYTELY) 236 g solution, Starting at 5 pm on day prior to procedure, drink 8 ounces every 30 minutes as directed, Disp: 4000 mL, Rfl: 0    No Known Allergies     Review of Systems   Constitutional: Negative for appetite change and  unexpected weight loss.   HENT: Negative for trouble swallowing.    Eyes: Negative for blurred vision.   Respiratory: Negative for choking and chest tightness.    Cardiovascular: Negative for leg swelling.   Gastrointestinal: Negative for abdominal distention, abdominal pain, anal bleeding, blood in stool, constipation, diarrhea, nausea, rectal pain, vomiting, GERD and indigestion.   Endocrine: Negative for polyphagia.   Genitourinary: Negative for hematuria.   Musculoskeletal: Negative for arthralgias and myalgias.   Skin: Negative for rash.   Allergic/Immunologic: Negative for food allergies.   Neurological: Negative for dizziness, syncope and confusion.   Hematological: Does not bruise/bleed easily.   Psychiatric/Behavioral: Negative for depressed mood.      The following portions of the patient's history were reviewed and updated as appropriate: allergies, current medications, past family history, past medical history, past social history, past surgical history and problem list.    Objective     Physical Exam   Constitutional: He is oriented to person, place, and time. He appears well-developed and well-nourished. No distress.   HENT:   Head: Normocephalic and atraumatic.   Right Ear: Hearing and external ear normal.   Left Ear: Hearing and external ear normal.   Nose: Nose normal.   Mouth/Throat: Oropharynx is clear and moist and mucous membranes are normal. Mucous membranes are not pale, not dry and not cyanotic. No oral lesions. No oropharyngeal exudate.   Eyes: Conjunctivae and EOM are normal. Right eye exhibits no discharge. Left eye exhibits no discharge.   Neck: Trachea normal. Neck supple. No JVD present. No edema present. No thyroid mass and no thyromegaly present.   Cardiovascular: Normal rate, regular rhythm, S2 normal and normal heart sounds. Exam reveals no gallop, no S3 and no friction rub.   No murmur heard.  Pulmonary/Chest: Effort normal and breath sounds normal. No respiratory distress. He  "exhibits no tenderness.   Abdominal: Normal appearance and bowel sounds are normal. He exhibits no distension, no ascites and no mass. There is no splenomegaly or hepatomegaly. There is no tenderness. There is no rigidity, no rebound and no guarding. No hernia.     Vascular Status -  His right foot exhibits no edema. His left foot exhibits no edema.  Lymphadenopathy:     He has no cervical adenopathy.        Left: No supraclavicular adenopathy present.   Neurological: He is alert and oriented to person, place, and time. He has normal strength. No cranial nerve deficit or sensory deficit.   Skin: No rash noted. He is not diaphoretic. No cyanosis. No pallor. Nails show no clubbing.   Psychiatric: He has a normal mood and affect.   Nursing note and vitals reviewed.    Vital Signs:   Vitals:    09/10/20 0823   BP: 116/70   Pulse: 53   Resp: 18   Temp: 97.7 °F (36.5 °C)   Weight: 73.9 kg (163 lb)   Height: 180.3 cm (71\")     Results Review:   I have reviewed the patient's new clinical and imaging results.    No visits with results within 180 Day(s) from this visit.   Latest known visit with results is:   Admission on 11/30/2018, Discharged on 11/30/2018   Component Date Value Ref Range Status   • Sodium 11/30/2018 130* 137 - 145 mmol/L Final   • Anaerobic Culture 11/30/2018 No anaerobes isolated   Final   • Fungus Stain 11/30/2018 Final report   Final   • KOH/Calcofluor preparation 11/30/2018 Comment   Final    KOH/Calcofluor preparation:  no fungus observed.   • Culture 11/30/2018 Final report   Final   • Fungus (Mycology) Result 1 11/30/2018 Comment   Final    No yeast or mold isolated after 4 weeks.   • Wound Culture 11/30/2018 Heavy growth (4+) Staphylococcus aureus*  Final   • Anaerobic Culture 11/30/2018 No anaerobes isolated   Final   • Fungus Stain 11/30/2018 Final report   Final   • KOH/Calcofluor preparation 11/30/2018 Comment   Final    KOH/Calcofluor preparation:  no fungus observed.   • Culture 11/30/2018 " Final report   Final   • Fungus (Mycology) Result 1 11/30/2018 Comment   Final    No yeast or mold isolated after 4 weeks.   • Tissue Culture 11/30/2018 Heavy growth (4+) Staphylococcus aureus*  Final      No radiology results for the last 90 days.     8/3/2020 Cologuard: Positive    Assessment / Plan      1. Positive colorectal cancer screening using DNA-based stool test  Positive Cologuard test 8/3/2020. The patient denies change in bowel habits, abdominal pain, or rectal bleeding. There is no history of weight loss. The patient has not had a colonoscopy in the past. There is no family history of GI malignancy.    - Case Request; Standing  - Implement Anesthesia Orders Day of Procedure; Standing  - Obtain Informed Consent; Standing  - Verify Bowel Prep Was Successful; Standing  - Oxygen Therapy- Nasal Cannula; 2 LPM; Titrate for SPO2: equal to or greater than, 90%; Standing  - sodium chloride 0.9 % infusion  - Case Request  - polyethylene glycol (GoLYTELY) 236 g solution; Starting at 5 pm on day prior to procedure, drink 8 ounces every 30 minutes as directed  Dispense: 4000 mL; Refill: 0  - bisacodyl (DULCOLAX) 5 MG EC tablet; Take as directed for colon prep  Dispense: 4 tablet; Refill: 0    Patient Instructions   1. Colonoscopy: Description of the procedure, risks, benefits, alternatives and options, including nonoperative options, were discussed with the patient in detail. The patient understands and wishes to proceed.  2. COVID-19 testing prior to procedure. The patient will need to self-quarantine after testing until the procedure. Instructions given to patient.     Angel Wade, APRN  9/10/2020    Please note that portions of this note may have been completed with a voice recognition program. Efforts were made to edit the dictations, but occasionally words are mistranscribed.

## 2020-09-11 PROBLEM — R19.5 POSITIVE COLORECTAL CANCER SCREENING USING DNA-BASED STOOL TEST: Status: ACTIVE | Noted: 2020-09-11

## 2020-10-01 RX ORDER — METOPROLOL SUCCINATE 25 MG/1
25 TABLET, EXTENDED RELEASE ORAL DAILY
COMMUNITY

## 2020-10-02 ENCOUNTER — LAB (OUTPATIENT)
Dept: LAB | Facility: HOSPITAL | Age: 65
End: 2020-10-02

## 2020-10-02 DIAGNOSIS — Z01.818 PREOP TESTING: ICD-10-CM

## 2020-10-02 LAB — SARS-COV-2 RNA NOSE QL NAA+PROBE: NOT DETECTED

## 2020-10-02 PROCEDURE — C9803 HOPD COVID-19 SPEC COLLECT: HCPCS

## 2020-10-02 PROCEDURE — U0004 COV-19 TEST NON-CDC HGH THRU: HCPCS

## 2020-10-05 ENCOUNTER — ANESTHESIA (OUTPATIENT)
Dept: GASTROENTEROLOGY | Facility: HOSPITAL | Age: 65
End: 2020-10-05

## 2020-10-05 ENCOUNTER — ANESTHESIA EVENT (OUTPATIENT)
Dept: GASTROENTEROLOGY | Facility: HOSPITAL | Age: 65
End: 2020-10-05

## 2020-10-05 ENCOUNTER — HOSPITAL ENCOUNTER (OUTPATIENT)
Facility: HOSPITAL | Age: 65
Setting detail: HOSPITAL OUTPATIENT SURGERY
Discharge: HOME | End: 2020-10-05
Attending: INTERNAL MEDICINE | Admitting: INTERNAL MEDICINE

## 2020-10-05 VITALS
SYSTOLIC BLOOD PRESSURE: 113 MMHG | BODY MASS INDEX: 23.1 KG/M2 | HEIGHT: 71 IN | WEIGHT: 165 LBS | TEMPERATURE: 97.6 F | OXYGEN SATURATION: 95 % | DIASTOLIC BLOOD PRESSURE: 77 MMHG | HEART RATE: 76 BPM | RESPIRATION RATE: 16 BRPM

## 2020-10-05 DIAGNOSIS — R19.5 POSITIVE COLORECTAL CANCER SCREENING USING DNA-BASED STOOL TEST: ICD-10-CM

## 2020-10-05 PROCEDURE — 25010000002 PROPOFOL 10 MG/ML EMULSION: Performed by: NURSE ANESTHETIST, CERTIFIED REGISTERED

## 2020-10-05 PROCEDURE — 45380 COLONOSCOPY AND BIOPSY: CPT | Performed by: INTERNAL MEDICINE

## 2020-10-05 PROCEDURE — 88305 TISSUE EXAM BY PATHOLOGIST: CPT | Performed by: INTERNAL MEDICINE

## 2020-10-05 RX ORDER — PROPOFOL 10 MG/ML
VIAL (ML) INTRAVENOUS AS NEEDED
Status: DISCONTINUED | OUTPATIENT
Start: 2020-10-05 | End: 2020-10-05 | Stop reason: SURG

## 2020-10-05 RX ORDER — SODIUM CHLORIDE 9 MG/ML
70 INJECTION, SOLUTION INTRAVENOUS CONTINUOUS PRN
Status: DISCONTINUED | OUTPATIENT
Start: 2020-10-05 | End: 2020-10-05 | Stop reason: HOSPADM

## 2020-10-05 RX ADMIN — GLYCOPYRROLATE 0.4 MG: 0.2 INJECTION, SOLUTION INTRAMUSCULAR; INTRAVITREAL at 10:27

## 2020-10-05 RX ADMIN — PROPOFOL 140 MCG/KG/MIN: 10 INJECTION, EMULSION INTRAVENOUS at 10:21

## 2020-10-05 RX ADMIN — PROPOFOL 100 MG: 10 INJECTION, EMULSION INTRAVENOUS at 10:21

## 2020-10-05 NOTE — DISCHARGE INSTRUCTIONS
- Discharge patient to home (ambulatory).   - High fiber diet.   - Continue present medications.   - Hold Xarelto for 48 hours  - Continue ASA  - Await pathology results.   - Repeat colonoscopy in 5 years for surveillance pending biopsy.

## 2020-10-05 NOTE — ANESTHESIA POSTPROCEDURE EVALUATION
Patient: Hung Joaquin    Procedure Summary     Date: 10/05/20 Room / [facility]: [facility] END[facility]COPY [facility]    Anesthesia Start: 1019 Anesthesia Stop:     Procedure: COLONOSCOPY,POLYPECTOMY (N/A Anus) Diagnosis:       Positive colorectal cancer screening using DNA-based stool test      (Positive colorectal cancer screening using DNA-based stool test [R19.5])    Surgeon: Diana Salter MD Provider: Berny Ding CRNA    Anesthesia Type: MAC ASA Status: 3          Anesthesia Type: MAC    HR 68  Sat 93  Resp 19  BP 89/56  Temp 97.6        Post Anesthesia Care and Evaluation    Patient location during evaluation: bedside  Patient participation: complete - patient participated  Level of consciousness: awake and alert and sleepy but conscious  Pain score: 0  Pain management: adequate  Airway patency: patent  Anesthetic complications: No anesthetic complications  PONV Status: none  Cardiovascular status: acceptable  Respiratory status: acceptable  Hydration status: acceptable

## 2020-10-05 NOTE — ANESTHESIA PREPROCEDURE EVALUATION
Anesthesia Evaluation     Patient summary reviewed and Nursing notes reviewed   no history of anesthetic complications:  NPO Solid Status: > 8 hours  NPO Liquid Status: > 8 hours           Airway   Mallampati: II  TM distance: >3 FB  Neck ROM: full  Dental      Pulmonary    (+) a smoker Current, COPD,   Cardiovascular     Patient on routine beta blocker    (+) hypertension, dysrhythmias Atrial Fib, hyperlipidemia,       Neuro/Psych  GI/Hepatic/Renal/Endo    (+)  GERD,  renal disease stones,     Musculoskeletal     Abdominal    Substance History      OB/GYN          Other   arthritis,                    Anesthesia Plan    ASA 3     MAC   (Risks and benefits discussed including risk of aspiration, recall and dental damage. All patient questions answered. Will continue with POC.)  intravenous induction     Anesthetic plan, all risks, benefits, and alternatives have been provided, discussed and informed consent has been obtained with: patient.    Plan discussed with CRNA.

## 2020-10-05 NOTE — H&P
"    [facility]  HISTORY AND PHYSICAL    Patient Name: Hung Joaquin  : 1955  MRN: [id number]    Chief Complaint:   For screening  colonoscopy    History Of Presenting Illness:    Positive cologaurd test  Screening gcolonoscopy    Past Medical History:   Diagnosis Date   • A-fib (CMS/HCC)     on xarelto   • Arthritis    • Elevated cholesterol    • Emphysema lung (CMS/HCC)    • Enlarged prostate    • GERD (gastroesophageal reflux disease)    • History of nuclear stress test ?    \"it was ok\"   • Hyperlipidemia    • Hypertension    • Jaundice    • Kidney stones    • Knee pain     bilateral   • Wears glasses     for reading       Past Surgical History:   Procedure Laterality Date   • CARDIAC CATHETERIZATION      \"IT WAS OK\"   • ELBOW DEBRIDEMENT Right 2018    Procedure: Incision drainage irrigation debridement septic right elbow olecranon bursa, placement of drain;  Surgeon: John Mcintyre MD;  Location: [facility];  Service: Orthopedics   • KNEE SURGERY Left    • PROSTATE SURGERY         Social History     Socioeconomic History   • Marital status:      Spouse name: Not on file   • Number of children: Not on file   • Years of education: Not on file   • Highest education level: Not on file   Tobacco Use   • Smoking status: Current Every Day Smoker     Packs/day: 1.00     Years: 45.00     Pack years: 45.00     Types: Cigarettes   • Smokeless tobacco: Never Used   Substance and Sexual Activity   • Alcohol use: No     Frequency: Never   • Drug use: No   • Sexual activity: Defer       Family History   Problem Relation Age of Onset   • Lung cancer Father    • Colon cancer Neg Hx        Prior to Admission Medications:  Medications Prior to Admission   Medication Sig Dispense Refill Last Dose   • albuterol (ACCUNEB) 1.25 MG/3ML nebulizer solution Take 1 ampule by nebulization Every 6 (Six) Hours As Needed for Wheezing.   10/4/2020 at 0700   • albuterol 108 (90 Base) MCG/ACT " inhaler Inhale 2 puffs Every 6 (Six) Hours As Needed for Wheezing.   10/4/2020 at 0900   • amLODIPine (NORVASC) 10 MG tablet Take 5 mg by mouth Daily.   10/4/2020 at 0700   • atorvastatin (LIPITOR) 80 MG tablet Take 80 mg by mouth Daily.   10/4/2020 at 2100   • bisacodyl (DULCOLAX) 5 MG EC tablet Take as directed for colon prep 4 tablet 0 10/4/2020 at 1900   • hydrochlorothiazide (HYDRODIURIL) 25 MG tablet Take 25 mg by mouth Daily.   10/4/2020 at 0700   • losartan (COZAAR) 100 MG tablet Take 100 mg by mouth Daily.   10/4/2020 at 1900   • meloxicam (MOBIC) 15 MG tablet Take 15 mg by mouth Daily.   10/4/2020 at 1900   • metoprolol succinate XL (TOPROL-XL) 25 MG 24 hr tablet Take 25 mg by mouth Daily.   10/4/2020 at 1900   • polyethylene glycol (GoLYTELY) 236 g solution Starting at 5 pm on day prior to procedure, drink 8 ounces every 30 minutes as directed 4000 mL 0 10/4/2020 at 1900   • aclidinium bromide (TUDORZA PRESSAIR) 400 MCG/ACT aerosol powder  powder for inhalation Inhale 1 puff 2 (Two) Times a Day.      • aspirin 81 MG chewable tablet Chew 81 mg Daily.   9/28/2020   • HM ALLERGY RELIEF 50 MCG/ACT nasal spray       • metoprolol tartrate (LOPRESSOR) 25 MG tablet Take 25 mg by mouth Daily.      • nitroglycerin (NITROSTAT) 0.4 MG SL tablet Place 0.4 mg under the tongue Every 5 (Five) Minutes As Needed for Chest Pain. Take no more than 3 doses in 15 minutes.   Unknown at Unknown time   • XARELTO 20 MG tablet Take 20 mg by mouth Daily. Last dose 10/1/20 in prep for colonoscopy   9/30/2020       Allergies:  No Known Allergies     Vitals: Temp:  [97.5 °F (36.4 °C)] 97.5 °F (36.4 °C)  Heart Rate:  [47] 47  Resp:  [17] 17  BP: (122)/(77) 122/77    Review Of Systems:  Constitutional:  Negative for chills, fever, and unexpected weight change.  Respiratory:  Negative for cough, chest tightness, shortness of breath, and wheezing.  Cardiovascular:  Negative for chest pain, palpitations, and leg swelling.  Gastrointestinal:   Negative for abdominal distention, abdominal pain, Nausea, vomiting.  Neurological:  Negative for Weakness, numbness, and headaches.     Physical Exam:    General Appearance:  Alert, cooperative, in no acute distress.   Lungs:   Clear to auscultation, respirations regular, even and                 unlabored.   Heart:  Regular rhythm and normal rate.   Abdomen:   Normal bowel sounds, no masses, no organomegaly. Soft, non-tender, non-distended   Neurologic: Alert and oriented x 3. Moves all four limbs equally       Plan: COLONOSCOPY (N/A)     Diana Salter MD  10/5/2020

## 2020-10-26 LAB
LAB AP CASE REPORT: NORMAL
LAB AP CLINICAL INFORMATION: NORMAL
PATH REPORT.FINAL DX SPEC: NORMAL

## 2020-11-10 ENCOUNTER — OFFICE VISIT (OUTPATIENT)
Dept: GASTROENTEROLOGY | Facility: CLINIC | Age: 65
End: 2020-11-10

## 2020-11-10 VITALS
WEIGHT: 164 LBS | TEMPERATURE: 98.7 F | HEART RATE: 75 BPM | RESPIRATION RATE: 18 BRPM | HEIGHT: 71 IN | SYSTOLIC BLOOD PRESSURE: 118 MMHG | BODY MASS INDEX: 22.96 KG/M2 | DIASTOLIC BLOOD PRESSURE: 70 MMHG

## 2020-11-10 DIAGNOSIS — K64.8 INTERNAL HEMORRHOIDS: Chronic | ICD-10-CM

## 2020-11-10 DIAGNOSIS — R12 HEARTBURN: Chronic | ICD-10-CM

## 2020-11-10 DIAGNOSIS — K57.30 DIVERTICULOSIS OF COLON: Chronic | ICD-10-CM

## 2020-11-10 DIAGNOSIS — D12.6 ADENOMATOUS POLYP OF COLON, UNSPECIFIED PART OF COLON: Primary | Chronic | ICD-10-CM

## 2020-11-10 PROBLEM — R19.5 POSITIVE COLORECTAL CANCER SCREENING USING DNA-BASED STOOL TEST: Status: RESOLVED | Noted: 2020-09-11 | Resolved: 2020-11-10

## 2020-11-10 PROCEDURE — 99213 OFFICE O/P EST LOW 20 MIN: CPT | Performed by: NURSE PRACTITIONER

## 2020-11-10 NOTE — PATIENT INSTRUCTIONS
1. High fiber, low fat diet with liberal water intake.   2. Antireflux measures: Avoid fried, fatty foods, alcohol, chocolate, coffee, tea,  soft drinks, peppermint and spearmint, spicy foods, tomatoes and tomato based foods, onion based foods, and smoking. Other antireflux measures include weight reduction if overweight, avoiding tight clothing around the abdomen, elevating the head of the bed 6 inches with blocks under the head board, and don't drink or eat before going to bed and avoid lying down immediately after meals.  3. Colonoscopy for surveillance in 5 years.  4. Follow up: The patient wants to call back

## 2020-11-10 NOTE — PROGRESS NOTES
"     Follow Up Note     Date: 11/10/2020   Patient Name: Hung Joaquin  MRN: [id number]  : 1955     Primary Care Provider: Gustavo Loyd MD     Chief Complaint:    Chief Complaint   Patient presents with   • Follow-up     History of present illness:   11/10/2020  Hung Joaquin is a 65 y.o. male who is here today for follow up after colonoscopy.     He has been doing very well. He has no complaints or concerns today.     Interval History:  9/10/2020    The patient had positive Cologuard test 8/3/2020. The patient denies recent change in bowel habits. There is no diarrhea or constipation. There is no history of abdominal pain. There is no history of overt GI bleed (hematemesis melena or hematochezia). The patient denies nausea or vomiting. There is a history of occasional heartburn that is well controlled with TUMs if needed. The patient denies dysphagia or odynophagia. There is no history of recent significant weight loss. There is no history of liver disease in the past. There is no family history of GI malignancy. The patient has not had a colonoscopy in the past.    Subjective      Past Medical History:   Diagnosis Date   • A-fib (CMS/HCC)     on xarelto   • Arthritis    • Colon polyp 10/05/2020   • Elevated cholesterol    • Emphysema lung (CMS/HCC)    • Enlarged prostate    • GERD (gastroesophageal reflux disease)    • History of nuclear stress test ?    \"it was ok\"   • Hyperlipidemia    • Hypertension    • Jaundice    • Kidney stones    • Knee pain     bilateral   • Wears glasses     for reading     Past Surgical History:   Procedure Laterality Date   • CARDIAC CATHETERIZATION      \"IT WAS OK\"   • COLONOSCOPY N/A 10/5/2020    Procedure: COLONOSCOPY,POLYPECTOMY;  Surgeon: Diana Salter MD;  Location: [facility] ENDOSCOPY;  Service: Gastroenterology;  Laterality: N/A;   • ELBOW DEBRIDEMENT Right 2018    Procedure: Incision drainage irrigation debridement septic " right elbow olecranon bursa, placement of drain;  Surgeon: John Mcintyre MD;  Location: [facility];  Service: Orthopedics   • KNEE SURGERY Left    • PROSTATE SURGERY       Family History   Problem Relation Age of Onset   • Lung cancer Father    • Colon cancer Neg Hx      Social History     Socioeconomic History   • Marital status:      Spouse name: Not on file   • Number of children: Not on file   • Years of education: Not on file   • Highest education level: Not on file   Tobacco Use   • Smoking status: Current Every Day Smoker     Packs/day: 1.00     Years: 45.00     Pack years: 45.00     Types: Cigarettes   • Smokeless tobacco: Never Used   Substance and Sexual Activity   • Alcohol use: No     Frequency: Never   • Drug use: No   • Sexual activity: Defer       Current Outpatient Medications:   •  albuterol (ACCUNEB) 1.25 MG/3ML nebulizer solution, Take 1 ampule by nebulization Every 6 (Six) Hours As Needed for Wheezing., Disp: , Rfl:   •  albuterol 108 (90 Base) MCG/ACT inhaler, Inhale 2 puffs Every 6 (Six) Hours As Needed for Wheezing., Disp: , Rfl:   •  amLODIPine (NORVASC) 10 MG tablet, Take 5 mg by mouth Daily., Disp: , Rfl:   •  aspirin 81 MG chewable tablet, Chew 81 mg Daily., Disp: , Rfl:   •  atorvastatin (LIPITOR) 80 MG tablet, Take 80 mg by mouth Daily., Disp: , Rfl:   •  hydrochlorothiazide (HYDRODIURIL) 25 MG tablet, Take 25 mg by mouth Daily., Disp: , Rfl:   •  losartan (COZAAR) 100 MG tablet, Take 100 mg by mouth Daily., Disp: , Rfl:   •  meloxicam (MOBIC) 15 MG tablet, Take 15 mg by mouth Daily., Disp: , Rfl:   •  metoprolol succinate XL (TOPROL-XL) 25 MG 24 hr tablet, Take 25 mg by mouth Daily., Disp: , Rfl:   •  nitroglycerin (NITROSTAT) 0.4 MG SL tablet, Place 0.4 mg under the tongue Every 5 (Five) Minutes As Needed for Chest Pain. Take no more than 3 doses in 15 minutes., Disp: , Rfl:   •  XARELTO 20 MG tablet, Take 20 mg by mouth Daily. Last dose 10/1/20 in prep for colonoscopy, Disp:  , Rfl:      No Known Allergies     Review of Systems   Constitutional: Negative for appetite change and unexpected weight loss.   HENT: Negative for trouble swallowing.    Eyes: Negative for blurred vision.   Respiratory: Negative for choking and chest tightness.    Cardiovascular: Negative for leg swelling.   Gastrointestinal: Negative for abdominal distention, abdominal pain, anal bleeding, blood in stool, constipation, diarrhea, nausea, rectal pain, vomiting, GERD and indigestion.   Endocrine: Negative for polyphagia.   Genitourinary: Negative for hematuria.   Musculoskeletal: Negative for arthralgias and myalgias.   Skin: Negative for rash.   Allergic/Immunologic: Negative for food allergies.   Neurological: Negative for dizziness, syncope and confusion.   Hematological: Does not bruise/bleed easily.   Psychiatric/Behavioral: Negative for depressed mood.      The following portions of the patient's history were reviewed and updated as appropriate: allergies, current medications, past family history, past medical history, past social history, past surgical history and problem list.  Objective     Physical Exam  Vitals signs and nursing note reviewed.   Constitutional:       General: He is not in acute distress.     Appearance: Normal appearance. He is well-developed. He is not diaphoretic.   HENT:      Head: Normocephalic and atraumatic.      Right Ear: Hearing and external ear normal.      Left Ear: Hearing and external ear normal.      Nose: Nose normal.      Mouth/Throat:      Mouth: Mucous membranes are not pale, not dry and not cyanotic. No oral lesions.      Pharynx: No oropharyngeal exudate.   Eyes:      General: Lids are normal.         Right eye: No discharge.         Left eye: No discharge.      Conjunctiva/sclera: Conjunctivae normal.   Neck:      Musculoskeletal: Neck supple. No edema.      Thyroid: No thyroid mass or thyromegaly.      Vascular: No JVD.      Trachea: Trachea normal.   Cardiovascular:   "     Rate and Rhythm: Normal rate and regular rhythm.      Heart sounds: Normal heart sounds and S2 normal. No murmur. No friction rub. No gallop. No S3 sounds.    Pulmonary:      Effort: Pulmonary effort is normal. No respiratory distress.      Breath sounds: Normal breath sounds.   Chest:      Chest wall: No tenderness.   Abdominal:      General: Bowel sounds are normal. There is no distension.      Palpations: Abdomen is soft. Abdomen is not rigid. There is no hepatomegaly, splenomegaly or mass.      Tenderness: There is no abdominal tenderness. There is no guarding or rebound.      Hernia: No hernia is present.   Lymphadenopathy:      Cervical: No cervical adenopathy.      Upper Body:      Left upper body: No supraclavicular adenopathy.   Skin:     General: Skin is warm and dry.      Coloration: Skin is not pale.      Findings: No rash.      Nails: There is no clubbing.     Neurological:      Mental Status: He is alert and oriented to person, place, and time.      Cranial Nerves: No cranial nerve deficit.      Sensory: No sensory deficit.   Psychiatric:         Mood and Affect: Mood normal.         Speech: Speech normal.         Behavior: Behavior is cooperative.       Vitals:    11/10/20 1616   BP: 118/70   Pulse: 75   Resp: 18   Temp: 98.7 °F (37.1 °C)   Weight: 74.4 kg (164 lb)   Height: 180.3 cm (71\")     Results Review:   I reviewed the patient's new clinical results.    Admission on 10/05/2020, Discharged on 10/05/2020   Component Date Value Ref Range Status   • Case Report 10/05/2020    Final                    Value:Surgical Pathology Report                         Case: [id number]                                  Authorizing Provider:  Diana Salter MD  Collected:           10/05/2020 10:34 AM          Ordering Location:     [facility]    Received:            10/05/2020 02:46 PM                                 [facility]                                                                "     Pathologist:           Saw Fleming MD                                                             Specimens:   1) - Large Intestine, Right / Ascending Colon, ascending colon polyp                                2) - Large Intestine, Right / Ascending Colon, Hepatic Flexure polyp                      • Clinical Information 10/05/2020    Final                    Value:This result contains rich text formatting which cannot be displayed here.   • Final Diagnosis 10/05/2020    Final                    Value:This result contains rich text formatting which cannot be displayed here.   Lab on 10/02/2020   Component Date Value Ref Range Status   • SARS-CoV-2 VICTORIANO 10/02/2020 Not Detected  Not Detected Final      No radiology results for the last 90 days.     Colonoscopy dated 10/5/2020 preparation of the colon was fair.  Stool in the sigmoid, descending, ascending colon and in the cecum.  2 colon polyps removed.  Diverticulosis in sigmoid and descending colon.  Nonbleeding internal hemorrhoids.  The examined portion of the ileum was normal.  Ascending colon polyp biopsy with tubular adenoma, no high-grade dysplasia.  Hepatic flexure polyp biopsy with tubular adenoma, no high-grade dysplasia.    Assessment / Plan      1. Adenomatous polyp of colon, unspecified part of colon  2 tubular adenomas without dysplasia removed.  Colonoscopy for surveillance in 5 years.    2. Diverticulosis of colon  Diverticulosis present in sigmoid and descending colon.  Stable.  High-fiber, low-fat diet with liberal water intake.    3. Internal hemorrhoids.  Nonbleeding internal hemorrhoids.  Stable.  High-fiber, low-fat diet with liberal water intake.    4. Heartburn  The patient has a history of occasional mild heartburn that is controlled with TUMs if needed. No history of reflux. No history of difficulty swallowing.  Continue antacids as needed.  Anti-reflux measures.  The patient may need EGD in future if symptoms worsen.    Patient  Instructions   1. High fiber, low fat diet with liberal water intake.   2. Antireflux measures: Avoid fried, fatty foods, alcohol, chocolate, coffee, tea,  soft drinks, peppermint and spearmint, spicy foods, tomatoes and tomato based foods, onion based foods, and smoking. Other antireflux measures include weight reduction if overweight, avoiding tight clothing around the abdomen, elevating the head of the bed 6 inches with blocks under the head board, and don't drink or eat before going to bed and avoid lying down immediately after meals.  3. Colonoscopy for surveillance in 5 years.  4. Follow up: The patient wants to call back    Angel Wade, APRN  11/10/2020    Please note that portions of this note may have been completed with a voice recognition program. Efforts were made to edit the dictations, but occasionally words are mistranscribed.

## (undated) DEVICE — DRSNG ADAPTIC 3X8

## (undated) DEVICE — PAD GRND REM POLYHESIVE A/ DISP

## (undated) DEVICE — UNDERCAST PADDING: Brand: DEROYAL

## (undated) DEVICE — SPLNT SCOTCHCAST CONFRM 3X15IN

## (undated) DEVICE — Device

## (undated) DEVICE — GLV SURG SENSICARE W/ALOE PF LF 8 STRL

## (undated) DEVICE — VLV SXN AIR/H2O ORCAPOD3 1P/U STRL

## (undated) DEVICE — SPNG GZ STRL 2S 4X4 12PLY

## (undated) DEVICE — LUBE JELLY PK/2.75GM STRL BX/144

## (undated) DEVICE — DRSNG WND GZ CURAD OIL EMULSION 3X3IN STRL

## (undated) DEVICE — BLD CLIP UNIV SURG GRY

## (undated) DEVICE — FRCP BIOP COLD ENDOJAW ALLGTR W/NDL 2.8X2300MM BLU

## (undated) DEVICE — BNDG COMPR TENSOPLAST ELASTC ADHS 6IN 5YD TAN

## (undated) DEVICE — GLV SURG SENSICARE W/ALOE PF LF 7.5 STRL

## (undated) DEVICE — UNDYED BRAIDED (POLYGLACTIN 910), SYNTHETIC ABSORBABLE SUTURE: Brand: COATED VICRYL

## (undated) DEVICE — GOWN,SIRUS,NON REINFRCD,LARGE,SET IN SL: Brand: MEDLINE

## (undated) DEVICE — BNDG ELAS MATRX V/CLS 4IN 5YD LF

## (undated) DEVICE — ENDOSCOPY PORT CONNECTOR FOR OLYMPUS® SCOPES: Brand: ERBE

## (undated) DEVICE — SYR LUERLOK 30CC

## (undated) DEVICE — DRSNG PAD ABD 8X10IN STRL

## (undated) DEVICE — SUT VIC 2/0 SH 27IN

## (undated) DEVICE — HYBRID TUBING/CAP SET FOR OLYMPUS® SCOPES: Brand: ERBE